# Patient Record
Sex: MALE | Race: ASIAN | NOT HISPANIC OR LATINO | URBAN - METROPOLITAN AREA
[De-identification: names, ages, dates, MRNs, and addresses within clinical notes are randomized per-mention and may not be internally consistent; named-entity substitution may affect disease eponyms.]

---

## 2020-12-02 ENCOUNTER — EMERGENCY (EMERGENCY)
Facility: HOSPITAL | Age: 35
LOS: 1 days | Discharge: HOME | End: 2020-12-02
Attending: STUDENT IN AN ORGANIZED HEALTH CARE EDUCATION/TRAINING PROGRAM
Payer: MEDICAID

## 2020-12-02 ENCOUNTER — EMERGENCY (EMERGENCY)
Facility: HOSPITAL | Age: 35
LOS: 1 days | Discharge: HOME | End: 2020-12-02
Attending: EMERGENCY MEDICINE
Payer: MEDICAID

## 2020-12-02 VITALS
HEART RATE: 88 BPM | HEIGHT: 70 IN | DIASTOLIC BLOOD PRESSURE: 80 MMHG | OXYGEN SATURATION: 99 % | SYSTOLIC BLOOD PRESSURE: 114 MMHG | TEMPERATURE: 98 F | WEIGHT: 136.69 LBS | RESPIRATION RATE: 16 BRPM

## 2020-12-02 VITALS
OXYGEN SATURATION: 100 % | WEIGHT: 138.01 LBS | HEART RATE: 87 BPM | RESPIRATION RATE: 20 BRPM | SYSTOLIC BLOOD PRESSURE: 111 MMHG | TEMPERATURE: 98 F | DIASTOLIC BLOOD PRESSURE: 72 MMHG | HEIGHT: 70 IN

## 2020-12-02 LAB
ALBUMIN SERPL ELPH-MCNC: 3.6 G/DL — SIGNIFICANT CHANGE UP (ref 3.5–5)
ALP SERPL-CCNC: 107 U/L — SIGNIFICANT CHANGE UP (ref 40–120)
ALT FLD-CCNC: 58 U/L DA — SIGNIFICANT CHANGE UP (ref 10–60)
ANION GAP SERPL CALC-SCNC: 6 MMOL/L — SIGNIFICANT CHANGE UP (ref 5–17)
AST SERPL-CCNC: 80 U/L — HIGH (ref 10–40)
BASOPHILS # BLD AUTO: 0.08 K/UL — SIGNIFICANT CHANGE UP (ref 0–0.2)
BASOPHILS NFR BLD AUTO: 2.7 % — HIGH (ref 0–2)
BILIRUB SERPL-MCNC: 0.3 MG/DL — SIGNIFICANT CHANGE UP (ref 0.2–1.2)
BUN SERPL-MCNC: 6 MG/DL — LOW (ref 7–18)
CALCIUM SERPL-MCNC: 8.2 MG/DL — LOW (ref 8.4–10.5)
CHLORIDE SERPL-SCNC: 108 MMOL/L — SIGNIFICANT CHANGE UP (ref 96–108)
CO2 SERPL-SCNC: 28 MMOL/L — SIGNIFICANT CHANGE UP (ref 22–31)
CREAT SERPL-MCNC: 0.61 MG/DL — SIGNIFICANT CHANGE UP (ref 0.5–1.3)
EOSINOPHIL # BLD AUTO: 0.17 K/UL — SIGNIFICANT CHANGE UP (ref 0–0.5)
EOSINOPHIL NFR BLD AUTO: 5.8 % — SIGNIFICANT CHANGE UP (ref 0–6)
GLUCOSE SERPL-MCNC: 88 MG/DL — SIGNIFICANT CHANGE UP (ref 70–99)
HCT VFR BLD CALC: 37.4 % — LOW (ref 39–50)
HGB BLD-MCNC: 12.3 G/DL — LOW (ref 13–17)
IMM GRANULOCYTES NFR BLD AUTO: 0.3 % — SIGNIFICANT CHANGE UP (ref 0–1.5)
LIDOCAIN IGE QN: 837 U/L — HIGH (ref 73–393)
LYMPHOCYTES # BLD AUTO: 1 K/UL — SIGNIFICANT CHANGE UP (ref 1–3.3)
LYMPHOCYTES # BLD AUTO: 34.2 % — SIGNIFICANT CHANGE UP (ref 13–44)
MCHC RBC-ENTMCNC: 31.9 PG — SIGNIFICANT CHANGE UP (ref 27–34)
MCHC RBC-ENTMCNC: 32.9 GM/DL — SIGNIFICANT CHANGE UP (ref 32–36)
MCV RBC AUTO: 96.9 FL — SIGNIFICANT CHANGE UP (ref 80–100)
MONOCYTES # BLD AUTO: 0.49 K/UL — SIGNIFICANT CHANGE UP (ref 0–0.9)
MONOCYTES NFR BLD AUTO: 16.8 % — HIGH (ref 2–14)
NEUTROPHILS # BLD AUTO: 1.17 K/UL — LOW (ref 1.8–7.4)
NEUTROPHILS NFR BLD AUTO: 40.2 % — LOW (ref 43–77)
NRBC # BLD: 0 /100 WBCS — SIGNIFICANT CHANGE UP (ref 0–0)
PLATELET # BLD AUTO: 157 K/UL — SIGNIFICANT CHANGE UP (ref 150–400)
POTASSIUM SERPL-MCNC: 4.1 MMOL/L — SIGNIFICANT CHANGE UP (ref 3.5–5.3)
POTASSIUM SERPL-SCNC: 4.1 MMOL/L — SIGNIFICANT CHANGE UP (ref 3.5–5.3)
PROT SERPL-MCNC: 7.5 G/DL — SIGNIFICANT CHANGE UP (ref 6–8.3)
RBC # BLD: 3.86 M/UL — LOW (ref 4.2–5.8)
RBC # FLD: 11.9 % — SIGNIFICANT CHANGE UP (ref 10.3–14.5)
SODIUM SERPL-SCNC: 142 MMOL/L — SIGNIFICANT CHANGE UP (ref 135–145)
TSH SERPL-MCNC: 0.85 UU/ML — SIGNIFICANT CHANGE UP (ref 0.34–4.82)
WBC # BLD: 2.92 K/UL — LOW (ref 3.8–10.5)
WBC # FLD AUTO: 2.92 K/UL — LOW (ref 3.8–10.5)

## 2020-12-02 PROCEDURE — 85025 COMPLETE CBC W/AUTO DIFF WBC: CPT

## 2020-12-02 PROCEDURE — 73620 X-RAY EXAM OF FOOT: CPT

## 2020-12-02 PROCEDURE — 99283 EMERGENCY DEPT VISIT LOW MDM: CPT | Mod: 25

## 2020-12-02 PROCEDURE — 73130 X-RAY EXAM OF HAND: CPT | Mod: 26,LT

## 2020-12-02 PROCEDURE — 36415 COLL VENOUS BLD VENIPUNCTURE: CPT

## 2020-12-02 PROCEDURE — 73620 X-RAY EXAM OF FOOT: CPT | Mod: 26,50

## 2020-12-02 PROCEDURE — 80053 COMPREHEN METABOLIC PANEL: CPT

## 2020-12-02 PROCEDURE — 99284 EMERGENCY DEPT VISIT MOD MDM: CPT | Mod: 25

## 2020-12-02 PROCEDURE — 99284 EMERGENCY DEPT VISIT MOD MDM: CPT

## 2020-12-02 PROCEDURE — 93005 ELECTROCARDIOGRAM TRACING: CPT

## 2020-12-02 PROCEDURE — 29125 APPL SHORT ARM SPLINT STATIC: CPT | Mod: LT

## 2020-12-02 PROCEDURE — 83690 ASSAY OF LIPASE: CPT

## 2020-12-02 PROCEDURE — 73130 X-RAY EXAM OF HAND: CPT

## 2020-12-02 PROCEDURE — 84443 ASSAY THYROID STIM HORMONE: CPT

## 2020-12-02 RX ORDER — IBUPROFEN 200 MG
600 TABLET ORAL ONCE
Refills: 0 | Status: COMPLETED | OUTPATIENT
Start: 2020-12-02 | End: 2020-12-02

## 2020-12-02 RX ADMIN — Medication 600 MILLIGRAM(S): at 14:43

## 2020-12-02 NOTE — ED ADULT TRIAGE NOTE - PAIN RATING/NUMBER SCALE (0-10): ACTIVITY
Mariana Baird RN
9

## 2020-12-02 NOTE — ED PROVIDER NOTE - CARE PROVIDER_API CALL
Giorgio Hernandez  ORTHOPAEDIC SURGERY  [address], [address] [place] [address]  Phone: [phone]  Fax: [phone]  Follow Up Time:

## 2020-12-02 NOTE — ED PROVIDER NOTE - PATIENT PORTAL LINK FT
You can access the FollowMyHealth Patient Portal offered by [facility] by registering at the following website: [email or web address]. By joining [employer]’s FollowMyHealth portal, you will also be able to view your health information using other applications (apps) compatible with our system.

## 2020-12-02 NOTE — ED PROVIDER NOTE - PROGRESS NOTE DETAILS
fx noted to proximal 5th phalanx.  Ulnar gutter splint applied. Patient now agreeable to pain meds, will provide IBU.  Will have patient f/u with ortho.  Return precautions provided.

## 2020-12-02 NOTE — ED PROVIDER NOTE - OBJECTIVE STATEMENT
34 y/o M pt with no significant PMHx and no significant PSHx presents to ED c/o 4 months of worsening numbness and pain to toes of both feet. Pt is employed by the  and recently got out of rehab 2 weeks ago. Pt anticipates gautam back in 2 weeks. Pt denies trauma, back pain, weakness, headache, dizziness, syncope, or any other acute complaints. NKDA. 36 y/o M pt with no significant PMHx and no significant PSHx presents to ED c/o 4 months of worsening numbness and pain to toes of both feet. Pt is employed by the  and recently got out of alcohol rehab 2 weeks ago. Had drastically decreased his drinking from 20 to 8 beers daily. Pt anticipates going back to rehab for assessment in 2 weeks, speaks with them daily. Pt denies trauma, back pain, weakness, headache, dizziness, cp, sob, HA, syncope, or any other acute complaints. NKDA.

## 2020-12-02 NOTE — ED ADULT NURSE NOTE - NS ED NOTE ABUSE SUSPICION NEGLECT YN
Noted.  
Patient is more worn out and is out and  the end of his road with cancer treatment per Paula at the cancer center.  He will receive one more month of drugs.    They are discussing Hospice options with the family and other support options.    Oncology will adjust medications.    
Please call Paula from the Cancer Center at [facility] to discuss discontinuing a blood pressure medication because of his blood pressure being low.  His cancer is progressing, and considering hospice.  Please call her at [phone].  
No

## 2020-12-02 NOTE — ED PROVIDER NOTE - PROGRESS NOTE DETAILS
Spoke with Ana who will provide out patient rehab resources as well. Pt is again adamant he has not passed out and is refusing any evaluation for syncope.

## 2020-12-02 NOTE — CHART NOTE - NSCHARTNOTEFT_GEN_A_CORE
Verbal referral for etoh abuse.    Pt is a 35 year old male  with no significant PMHx and no significant PSHx . Pt came to the ED c/o 4 months of worsening numbness and pain to toes of both feet. Roberto met with Pt at bedside re referral. He was alert and orientedx3 . He admitted to drinking alcohol and  reported that he has just completed a 2 week alcohol rehab . He reports that he has reduced his alcohol consumption from a litre to 8 shots a day. Pt further reports that he is affiliated to an outpt alcohol program and he receives a call from the program daily.(Pt did not provide name of the program and the phone number provided has been disconnected (  [phone]) Pt was counseled / educated re alcohol and was also commended for practicing harm reduction. Pt was provided with aosas resources. Pt verbalized understanding to counseling. No further swk intervention needed at this time. ED Provider made aware. Pt was d/c'd from the ED.

## 2020-12-02 NOTE — ED PROVIDER NOTE - CLINICAL SUMMARY MEDICAL DECISION MAKING FREE TEXT BOX
34 y/o M pt with chronic toe pain and numbness. Sx suggest neuropathy likely due to chronic EtOH use. Will perform EKG, labs, and consult social work.

## 2020-12-02 NOTE — ED ADULT TRIAGE NOTE - CHIEF COMPLAINT QUOTE
C/o b/l foot pain., ED visit earlier today for  finger pain left hand. Patients brother reported  that patient has been having syncopal episodes after drinking alcohol per Ronny NP
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
## 2020-12-02 NOTE — ED PROVIDER NOTE - ATTENDING CONTRIBUTION TO CARE
I performed the initial face to face bedside interview with this patient regarding history of present illness, review of symptoms and past medical, social and family history.  I completed an independent physical examination.  I was the initial provider who evaluated this patient.  The history, review of symptoms and examination was documented by the scribe in my presence and I attest to the accuracy of the documentation.  I have signed out the follow up of any pending tests (i.e. labs, radiological studies) to the PA/NP.  I have discussed the patient’s plan of care and disposition with the PA/NP.   well appearing male, no acute distress. hand swelling and tenderness. concern for fracture. xray and pain control.

## 2020-12-02 NOTE — ED PROVIDER NOTE - PATIENT PORTAL LINK FT
You can access the FollowMyHealth Patient Portal offered by [facility] by registering at the following website: [email or web address]. By joining [employer]’s FollowMyHealth portal, you will also be able to view your health information using other applications (apps) compatible with our system.
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
## 2020-12-02 NOTE — ED POST DISCHARGE NOTE - ADDITIONAL DOCUMENTATION
brother called, stated that patient has been having multiple episodes of syncope after drinking.  States brother does not usually state this to ED team and would like patient evaluated.  Writer told brother to bring patient back to ED for eval.

## 2020-12-02 NOTE — ED PROVIDER NOTE - PHYSICAL EXAMINATION
MSK: swelling to L hand extending from mid palm down, worse in L 5th finger, tenderness to palpation over L 4th and 5th MCP joints, unable to fully clench fist secondary to pain, sensation intact, cap refill <2secs

## 2020-12-02 NOTE — ED PROVIDER NOTE - MUSCULOSKELETAL, MLM
Spine appears normal, range of motion is not limited. Nonspecific sensitivity and tenderness to toes on b/l feet. DP & PT pulses intact 2+ and palpable

## 2020-12-09 PROBLEM — Z00.00 ENCOUNTER FOR PREVENTIVE HEALTH EXAMINATION: Status: ACTIVE | Noted: 2020-12-09

## 2020-12-11 ENCOUNTER — APPOINTMENT (OUTPATIENT)
Dept: ORTHOPEDIC SURGERY | Facility: CLINIC | Age: 35
End: 2020-12-11
Payer: MEDICAID

## 2020-12-11 VITALS
OXYGEN SATURATION: 99 % | SYSTOLIC BLOOD PRESSURE: 121 MMHG | HEART RATE: 90 BPM | HEIGHT: 70 IN | DIASTOLIC BLOOD PRESSURE: 79 MMHG | BODY MASS INDEX: 19.47 KG/M2 | WEIGHT: 136 LBS

## 2020-12-11 DIAGNOSIS — Z78.9 OTHER SPECIFIED HEALTH STATUS: ICD-10-CM

## 2020-12-11 DIAGNOSIS — F17.200 NICOTINE DEPENDENCE, UNSPECIFIED, UNCOMPLICATED: ICD-10-CM

## 2020-12-11 PROCEDURE — 73130 X-RAY EXAM OF HAND: CPT | Mod: LT

## 2020-12-11 PROCEDURE — 99204 OFFICE O/P NEW MOD 45 MIN: CPT

## 2020-12-11 RX ORDER — DICLOFENAC SODIUM 50 MG/1
50 TABLET, DELAYED RELEASE ORAL TWICE DAILY
Qty: 60 | Refills: 0 | Status: ACTIVE | COMMUNITY
Start: 2020-12-11

## 2020-12-11 NOTE — HISTORY OF PRESENT ILLNESS
[de-identified] : CC L Hand\par \par HPI 34 yo male left HD presents with acute onset of one week of constant pain in the small finger left hand after injury. The pain is same, and rated a 5 out of 10, described as sharp, [without radiation]. Rest makes the pain better and motion makes the pain worse. The patient reports associated symptoms of swelling. The patient - pain at night affecting sleep, - neck pain, and + similar pain previously small finger and ring finger right hand.\par \par The patient has tried the following treatments:\par Activity modification	+\par Ice			+\par Brace			+ patient's was removed the brace multiple times\par Nsaids    		-\par Physical Therapy  	-\par Cortisone Injection	-\par Arthroscopy/Surgery	-\par \par Review of Systems is positive for the above musculoskeletal symptoms and is otherwise non-contributory for general, constitutional, psychiatric, neurologic, HEENT, cardiac, respiratory, gastrointestinal, reproductive, lymphatic, and dermatologic complaints.\par \par Consult by

## 2020-12-11 NOTE — DISCUSSION/SUMMARY
[de-identified] : Left fifth small finger proximal phalanx base intra-articular fracture with mild dorsal angulation\par \par \par I discussed with Seth history exam and radiology\par \par Discussed operative and nonoperative modalities in the patient's dominant hand injury and career\par \par \par Patient does not wish for surgical management\par \par Ulnar gutter splint applied intrinsic plus\par \par Elevate ice\par \par The patient was prescribed Diclofenac PO non-steroidal anti-inflammatory medication. 50mg tablets twice daily to be taken for at least 1-2 weeks in a row and then PRN afterwards. Risks and benefits were discussed and include but not limited to renal damage and GI ulceration and bleeding.  They were advised to take with food to limit stomach upset as well as warned to stop the medication if worsening gastric pain or dizziness or other side effects. Also to immediately stop the medication and seek appropriate medical attention if any severe stomach ache, gastritis, black/red vomit, black/red stools or any other medical concern.\par \par \par \par Follow up one week

## 2021-01-12 ENCOUNTER — APPOINTMENT (OUTPATIENT)
Dept: ORTHOPEDIC SURGERY | Facility: CLINIC | Age: 36
End: 2021-01-12
Payer: MEDICAID

## 2021-01-12 VITALS
HEIGHT: 70 IN | WEIGHT: 136 LBS | HEART RATE: 103 BPM | BODY MASS INDEX: 19.47 KG/M2 | SYSTOLIC BLOOD PRESSURE: 117 MMHG | DIASTOLIC BLOOD PRESSURE: 76 MMHG

## 2021-01-12 DIAGNOSIS — S62.617A DISPLACED FRACTURE OF PROXIMAL PHALANX OF LEFT LITTLE FINGER, INITIAL ENCOUNTER FOR CLOSED FRACTURE: ICD-10-CM

## 2021-01-12 PROCEDURE — 99214 OFFICE O/P EST MOD 30 MIN: CPT

## 2021-01-12 PROCEDURE — 73140 X-RAY EXAM OF FINGER(S): CPT | Mod: F4

## 2021-01-12 PROCEDURE — 99072 ADDL SUPL MATRL&STAF TM PHE: CPT

## 2021-01-12 NOTE — PHYSICAL EXAM
[de-identified] : Physical Examination\par General: well nourished, in no acute distress, alert and oriented to person, place and time\par Psychiatric: normal mood and affect, no abnormal movements or speech patterns\par Eyes: vision intact + glasses\par Throat: no thyromegaly\par Lymph: no enlarged nodes, no lymphedema in extremity\par Respiratory: no wheezing, no shortness of breath with ambulation\par Cardiac: no cardiac leg swelling, 2+ peripheral pulses\par Neurology: normal gross sensation in extremities to light touch\par Abdomen: soft, non-tender, tympanic, no masses\par \par Musculoskeletal Examination\par Cervical spine		Full painless range of motion and negative Spurling's test\par Limited exam secondary to fracture\par Hand			Right			Left\par Appearance\par      Skin			normal			normal\par      Swelling/Deformity	normal			swelling about the small finger\par  Range of Motion\par      Wrist Flexion		75			75\par      Wrist Extension	60			60\par      Finger Flexion  	0 cm palmar crease	0 cm palmar crease decreased motion SF MCP\par      Finger Extension	Full			Full decreased motion SF MCP\par      Supination		85			85\par      Pronation		90			90\par \par Palpation\par      Snuff box		-			-\par      ScaphoLunate 	-			-\par      ECU/Ulna Styloid	-			-\par      Cubital Tunnel 	-			-\par      OTHER		-			- SF base PF\par Strength Examination\par      Wrist Flexion		5+ / 0			5+ / 0\par      Wrist Extension	5+ / 0			5+ / 0\par      Finger Flexion  	5+ / 0			5+ / 0\par      Finger Extension	5+ / 0			5+ / 0\par      			-			-\par      Pincer		-			-\par Sensation\par      Axillary		normal			normal\par      LatAntCubBrach 	normal			normal\par      Median 		normal			normal\par      Ulnar 		normal			normal\par      Radial 		normal			normal\par Motor\par      AIN 			normal			normal\par      Ulnar 		normal			normal\par      Radial 		normal			normal\par      PIN 			normal			normal\par Pulses\par      Radial	 	2+			2+\par  [de-identified] : 3 views of the left hand\par 12-11-20\par Demonstrate:\par \par Minimal displaced fracture of the small finger proximal phalanx with intra-articular extension with apex volar with roughly 15° of dorsal angulation slightly increased from prior ER view\par \par 3 views of the left hand\par Were Ordered, Taken and Evaluated by Myself Today and\par Demonstrate:\par \par Minimal displaced fracture of the small finger proximal phalanx with intra-articular extension with apex volar with roughly 15° of dorsal angulation unchanged from prior visit, interval callous formation and mild consolidation

## 2021-01-12 NOTE — HISTORY OF PRESENT ILLNESS
[de-identified] : CC L Hand\par \par HPI 34 yo male left HD presents for 6 week splint fu of acute onset of one week of constant pain in the small finger left hand after injury. The pain is same, and rated a 5 out of 10, described as sharp, [without radiation]. Rest makes the pain better and motion makes the pain worse. The patient reports associated symptoms of swelling. The patient - pain at night affecting sleep, - neck pain, and + similar pain previously small finger and ring finger right hand.\par \par The patient has tried the following treatments:\par Activity modification	+\par Ice			+\par Brace			+ patient's was removed the brace multiple times, removed splint applined last visit and didn't replace splint\par Nsaids    		-\par Physical Therapy  	-\par Cortisone Injection	-\par Arthroscopy/Surgery	-\par \par no pain, + stiffness\par \par Review of Systems is positive for the above musculoskeletal symptoms and is otherwise non-contributory for general, constitutional, psychiatric, neurologic, HEENT, cardiac, respiratory, gastrointestinal, reproductive, lymphatic, and dermatologic complaints.\par \par Consult by

## 2021-01-12 NOTE — DISCUSSION/SUMMARY
[de-identified] : Left fifth small finger proximal phalanx base intra-articular fracture with mild dorsal angulation\par \par \par I discussed with Seth history exam and radiology\par \par Discussed operative and nonoperative modalities in the patient's dominant hand injury and career\par \par \par Patient does not wish for surgical management\par \par PT\par \par Follow up 6 week

## 2021-01-14 ENCOUNTER — TRANSCRIPTION ENCOUNTER (OUTPATIENT)
Age: 36
End: 2021-01-14

## 2021-02-23 ENCOUNTER — APPOINTMENT (OUTPATIENT)
Dept: ORTHOPEDIC SURGERY | Facility: CLINIC | Age: 36
End: 2021-02-23

## 2021-05-27 ENCOUNTER — EMERGENCY (EMERGENCY)
Facility: HOSPITAL | Age: 36
LOS: 1 days | Discharge: HOME | End: 2021-05-27
Attending: EMERGENCY MEDICINE
Payer: MEDICAID

## 2021-05-27 VITALS
HEIGHT: 70 IN | RESPIRATION RATE: 6 BRPM | WEIGHT: 160.06 LBS | DIASTOLIC BLOOD PRESSURE: 90 MMHG | HEART RATE: 115 BPM | TEMPERATURE: 99 F | SYSTOLIC BLOOD PRESSURE: 132 MMHG | OXYGEN SATURATION: 96 %

## 2021-05-27 LAB
ALBUMIN SERPL ELPH-MCNC: 4.2 G/DL — SIGNIFICANT CHANGE UP (ref 3.5–5)
ALP SERPL-CCNC: 130 U/L — HIGH (ref 40–120)
ALT FLD-CCNC: 89 U/L DA — HIGH (ref 10–60)
ANION GAP SERPL CALC-SCNC: 12 MMOL/L — SIGNIFICANT CHANGE UP (ref 5–17)
AST SERPL-CCNC: 238 U/L — HIGH (ref 10–40)
BILIRUB SERPL-MCNC: 0.9 MG/DL — SIGNIFICANT CHANGE UP (ref 0.2–1.2)
BUN SERPL-MCNC: 6 MG/DL — LOW (ref 7–18)
CALCIUM SERPL-MCNC: 8.8 MG/DL — SIGNIFICANT CHANGE UP (ref 8.4–10.5)
CHLORIDE SERPL-SCNC: 100 MMOL/L — SIGNIFICANT CHANGE UP (ref 96–108)
CO2 SERPL-SCNC: 26 MMOL/L — SIGNIFICANT CHANGE UP (ref 22–31)
CREAT SERPL-MCNC: 0.6 MG/DL — SIGNIFICANT CHANGE UP (ref 0.5–1.3)
GLUCOSE SERPL-MCNC: 95 MG/DL — SIGNIFICANT CHANGE UP (ref 70–99)
HCT VFR BLD CALC: 36.8 % — LOW (ref 39–50)
HGB BLD-MCNC: 12.5 G/DL — LOW (ref 13–17)
MCHC RBC-ENTMCNC: 32.4 PG — SIGNIFICANT CHANGE UP (ref 27–34)
MCHC RBC-ENTMCNC: 34 GM/DL — SIGNIFICANT CHANGE UP (ref 32–36)
MCV RBC AUTO: 95.3 FL — SIGNIFICANT CHANGE UP (ref 80–100)
NRBC # BLD: 0 /100 WBCS — SIGNIFICANT CHANGE UP (ref 0–0)
PLATELET # BLD AUTO: 46 K/UL — LOW (ref 150–400)
POTASSIUM SERPL-MCNC: 3.8 MMOL/L — SIGNIFICANT CHANGE UP (ref 3.5–5.3)
POTASSIUM SERPL-SCNC: 3.8 MMOL/L — SIGNIFICANT CHANGE UP (ref 3.5–5.3)
PROT SERPL-MCNC: 7.8 G/DL — SIGNIFICANT CHANGE UP (ref 6–8.3)
RBC # BLD: 3.86 M/UL — LOW (ref 4.2–5.8)
RBC # FLD: 12.4 % — SIGNIFICANT CHANGE UP (ref 10.3–14.5)
SODIUM SERPL-SCNC: 138 MMOL/L — SIGNIFICANT CHANGE UP (ref 135–145)
WBC # BLD: 3.15 K/UL — LOW (ref 3.8–10.5)
WBC # FLD AUTO: 3.15 K/UL — LOW (ref 3.8–10.5)

## 2021-05-27 PROCEDURE — 99284 EMERGENCY DEPT VISIT MOD MDM: CPT

## 2021-05-27 PROCEDURE — 99283 EMERGENCY DEPT VISIT LOW MDM: CPT

## 2021-05-27 PROCEDURE — 36415 COLL VENOUS BLD VENIPUNCTURE: CPT

## 2021-05-27 PROCEDURE — 80053 COMPREHEN METABOLIC PANEL: CPT

## 2021-05-27 PROCEDURE — 85027 COMPLETE CBC AUTOMATED: CPT

## 2021-05-27 RX ORDER — CICLOPIROX OLAMINE 7.7 MG/G
1 CREAM TOPICAL
Qty: 168 | Refills: 0
Start: 2021-05-27 | End: 2021-11-10

## 2021-05-27 RX ORDER — FLUCONAZOLE 150 MG/1
1 TABLET ORAL
Qty: 7 | Refills: 0
Start: 2021-05-27 | End: 2021-06-02

## 2021-05-27 NOTE — ED PROVIDER NOTE - PATIENT PORTAL LINK FT
You can access the FollowMyHealth Patient Portal offered by [facility] by registering at the following website: [email or web address]. By joining [employer]’s FollowMyHealth portal, you will also be able to view your health information using other applications (apps) compatible with our system.

## 2021-05-27 NOTE — ED ADULT TRIAGE NOTE - CHIEF COMPLAINT QUOTE
BLE swelling/ pain x 4 months, no SOB, pt also stated "I think I have fungus as well in my toe nails"

## 2021-05-27 NOTE — ED ADULT NURSE NOTE - OBJECTIVE STATEMENT
Pt c/o B/L LE swelling x 4 months. No acute distress noted, denies chest pain, no shortness of breath indicated.

## 2021-05-27 NOTE — ED PROVIDER NOTE - OBJECTIVE STATEMENT
37 y/o M pt with no significant PMHx presents to ED c/o persistent pain of both feet w/broken skin between both feet as well as discoloration of nails x2-3 months. Pt denies any other acute complaints. NKDA.

## 2021-05-27 NOTE — ED ADULT TRIAGE NOTE - ARRIVAL FROM
FOR FEVER, ACHES AND PAIN:    Acetaminophen (Tylenol): Maximum 1 gram (1000mg) per dose, up to 3 times per day.  Maximum 3000mg in 24 hours    Ibuprofen (Motrin, Advil): Maximum 800mg per dose, up to 3 times per day.  Maximum 2400mg in 24 hours.  Take with food.  If you take this daily for more than 1 week, start taking over the counter Omeprazole (Prilosec) or Esomeprazole (Nexium) daily to protect your stomach.    Naproxen (Aleve): Maximum 500mg per dose, up to 2 times per day.  Maximum 2 doses in 24 hours.  Take with food.  If you take this daily for more than 1 week, start taking over the counter Omeprazole (Prilosec) or Esomeprazole (Nexium) daily to protect your stomach.    FOR ALLERGIES AND NASAL SINUS CONGESTION:  (sneezing, itching, runny nose, stuff nose)    Take an oral antihistamine (Claritin, Zyrtec, Allegra, Benadryl) daily (in the evening if it makes you sleepy).    Use a nasal steroid spray (Flonase, Nasonex, Nasacort, Rhinocort) twice daily until congestion gets better, then decrease to as needed.    Take over the counter decongestants like phenylephrine and pseudoephedrine.  **Do not take these if you have high blood pressure**    FOR CHEST CONGESTION AND MUCOUS:    Take over the counter Guaifenesin - this will loosen and break up thick mucous.    Vicks Vapor Rub    FOR COUGH:    Over the counter Dextromethorphan    Cough drops    Warm liquids with honey    FOR THROAT PAIN:    Over the counter Chloraseptic spray, Cepacol lozenges    Warm liquids   Home

## 2021-06-20 ENCOUNTER — INPATIENT (INPATIENT)
Facility: HOSPITAL | Age: 36
LOS: 7 days | Discharge: HOME | DRG: 896 | End: 2021-06-28
Attending: HOSPITALIST | Admitting: HOSPITALIST
Payer: MEDICAID

## 2021-06-20 VITALS
SYSTOLIC BLOOD PRESSURE: 124 MMHG | TEMPERATURE: 99 F | HEIGHT: 70 IN | RESPIRATION RATE: 16 BRPM | OXYGEN SATURATION: 97 % | DIASTOLIC BLOOD PRESSURE: 91 MMHG | HEART RATE: 124 BPM

## 2021-06-20 LAB
ALBUMIN SERPL ELPH-MCNC: 3.9 G/DL — SIGNIFICANT CHANGE UP (ref 3.5–5)
ALBUMIN SERPL ELPH-MCNC: 4.1 G/DL — SIGNIFICANT CHANGE UP (ref 3.5–5)
ALP SERPL-CCNC: 100 U/L — SIGNIFICANT CHANGE UP (ref 40–120)
ALT FLD-CCNC: 76 U/L DA — HIGH (ref 10–60)
ANION GAP SERPL CALC-SCNC: 12 MMOL/L — SIGNIFICANT CHANGE UP (ref 5–17)
ANION GAP SERPL CALC-SCNC: 12 MMOL/L — SIGNIFICANT CHANGE UP (ref 5–17)
AST SERPL-CCNC: 159 U/L — HIGH (ref 10–40)
BILIRUB SERPL-MCNC: 1.5 MG/DL — HIGH (ref 0.2–1.2)
BUN SERPL-MCNC: 5 MG/DL — LOW (ref 7–18)
BUN SERPL-MCNC: 5 MG/DL — LOW (ref 7–18)
CALCIUM SERPL-MCNC: 9.3 MG/DL — SIGNIFICANT CHANGE UP (ref 8.4–10.5)
CALCIUM SERPL-MCNC: 9.4 MG/DL — SIGNIFICANT CHANGE UP (ref 8.4–10.5)
CHLORIDE SERPL-SCNC: 100 MMOL/L — SIGNIFICANT CHANGE UP (ref 96–108)
CHLORIDE SERPL-SCNC: 100 MMOL/L — SIGNIFICANT CHANGE UP (ref 96–108)
CO2 SERPL-SCNC: 24 MMOL/L — SIGNIFICANT CHANGE UP (ref 22–31)
CO2 SERPL-SCNC: 25 MMOL/L — SIGNIFICANT CHANGE UP (ref 22–31)
CREAT SERPL-MCNC: 0.69 MG/DL — SIGNIFICANT CHANGE UP (ref 0.5–1.3)
CREAT SERPL-MCNC: 0.73 MG/DL — SIGNIFICANT CHANGE UP (ref 0.5–1.3)
GLUCOSE SERPL-MCNC: 137 MG/DL — HIGH (ref 70–99)
GLUCOSE SERPL-MCNC: 138 MG/DL — HIGH (ref 70–99)
HCT VFR BLD CALC: 37 % — LOW (ref 39–50)
HGB BLD-MCNC: 12.5 G/DL — LOW (ref 13–17)
MAGNESIUM SERPL-MCNC: 1.4 MG/DL — LOW (ref 1.6–2.6)
MCHC RBC-ENTMCNC: 33 PG — SIGNIFICANT CHANGE UP (ref 27–34)
MCHC RBC-ENTMCNC: 33.8 GM/DL — SIGNIFICANT CHANGE UP (ref 32–36)
MCV RBC AUTO: 97.6 FL — SIGNIFICANT CHANGE UP (ref 80–100)
PHOSPHATE SERPL-MCNC: 1.9 MG/DL — LOW (ref 2.5–4.5)
POTASSIUM SERPL-MCNC: 3.7 MMOL/L — SIGNIFICANT CHANGE UP (ref 3.5–5.3)
POTASSIUM SERPL-MCNC: 3.8 MMOL/L — SIGNIFICANT CHANGE UP (ref 3.5–5.3)
POTASSIUM SERPL-SCNC: 3.7 MMOL/L — SIGNIFICANT CHANGE UP (ref 3.5–5.3)
POTASSIUM SERPL-SCNC: 3.8 MMOL/L — SIGNIFICANT CHANGE UP (ref 3.5–5.3)
PROT SERPL-MCNC: 7.9 G/DL — SIGNIFICANT CHANGE UP (ref 6–8.3)
RBC # BLD: 3.79 M/UL — LOW (ref 4.2–5.8)
RBC # FLD: 11.7 % — SIGNIFICANT CHANGE UP (ref 10.3–14.5)
SODIUM SERPL-SCNC: 136 MMOL/L — SIGNIFICANT CHANGE UP (ref 135–145)
SODIUM SERPL-SCNC: 137 MMOL/L — SIGNIFICANT CHANGE UP (ref 135–145)
TROPONIN I SERPL-MCNC: <0.015 NG/ML — SIGNIFICANT CHANGE UP (ref 0–0.04)

## 2021-06-20 PROCEDURE — 99285 EMERGENCY DEPT VISIT HI MDM: CPT

## 2021-06-20 RX ORDER — FOLIC ACID 0.8 MG
1 TABLET ORAL ONCE
Refills: 0 | Status: COMPLETED | OUTPATIENT
Start: 2021-06-20 | End: 2021-06-20

## 2021-06-20 RX ORDER — SODIUM CHLORIDE 9 MG/ML
1000 INJECTION INTRAMUSCULAR; INTRAVENOUS; SUBCUTANEOUS ONCE
Refills: 0 | Status: COMPLETED | OUTPATIENT
Start: 2021-06-20 | End: 2021-06-20

## 2021-06-20 RX ORDER — ONDANSETRON 8 MG/1
4 TABLET, FILM COATED ORAL ONCE
Refills: 0 | Status: COMPLETED | OUTPATIENT
Start: 2021-06-20 | End: 2021-06-20

## 2021-06-20 RX ORDER — THIAMINE MONONITRATE (VIT B1) 100 MG
500 TABLET ORAL ONCE
Refills: 0 | Status: COMPLETED | OUTPATIENT
Start: 2021-06-20 | End: 2021-06-20

## 2021-06-20 RX ORDER — DIAZEPAM 5 MG
10 TABLET ORAL ONCE
Refills: 0 | Status: DISCONTINUED | OUTPATIENT
Start: 2021-06-20 | End: 2021-06-20

## 2021-06-20 RX ADMIN — Medication 105 MILLIGRAM(S): at 23:59

## 2021-06-20 RX ADMIN — Medication 1 MILLIGRAM(S): at 23:58

## 2021-06-20 RX ADMIN — Medication 10 MILLIGRAM(S): at 23:59

## 2021-06-20 RX ADMIN — ONDANSETRON 4 MILLIGRAM(S): 8 TABLET, FILM COATED ORAL at 23:19

## 2021-06-20 RX ADMIN — Medication 1 MILLIGRAM(S): at 23:59

## 2021-06-20 RX ADMIN — SODIUM CHLORIDE 1000 MILLILITER(S): 9 INJECTION INTRAMUSCULAR; INTRAVENOUS; SUBCUTANEOUS at 23:19

## 2021-06-20 NOTE — ED ADULT TRIAGE NOTE - CHIEF COMPLAINT QUOTE
as per mother pt has been drinking alcohol frequently for 16 years , last drink was yesterday, and today pt has been vomiting many times and just had a seizure 30 min prior to arrival, mother found pt shaking , eyes rolling unresponsive with foam coming out of his mouth, pt is paranoid as per ems , fs was 156 as per ems  pt lives with his mother

## 2021-06-20 NOTE — ED ADULT NURSE NOTE - PRO INTERPRETER NEED 2
Dr. Wes Alfaro is supervising provider today.    Levon presents to the clinic [facility] for 2 unit of PRBCs    1 - No physically strenuous activity, but ambulatory and able to carry out light or sedentary work.    Nursing Assessment: A comprehensive nursing assessment was performed and the patient reports the following:     Please refer to JUSTO Estrada note for ROS.      Pre-Treatment:    Visit Vitals  /67   Pulse 85   Temp 98.2 °F (36.8 °C) (Oral)   Resp 18   SpO2 100%     Patient type and crossed on 9/24/18  Consent obtained by provider.  Premedications: Yes     Labs:  ?  WBC (K/mcL)   Date Value   09/24/2018 7.1     RBC (mil/mcL)   Date Value   09/24/2018 2.19 (L)     HCT (%)   Date Value   09/24/2018 16.3 (L)     HGB (g/dL)   Date Value   09/24/2018 4.5 (LL)     PLT (K/mcL)   Date Value   09/24/2018 159   11/25/2013 263        Treatment:    Refer to [facility] and MAR for line assessment and medication administration  Refer to Blood Administration flowsheet for documentation  Patient is identified by first & last name, date of birth, MRN, with the patient at the chair side.      Post Treatment: Treatment tolerated well; no adverse reaction  ?  Education:    Barriers to Learning: None  Teaching Method: Blood Transfusion FYWB  Reviewed possible transfusion reaction including: type of reaction, symptoms and management.  Learner Outcomes:  Patient and/or caregiver verbalize understanding of information given    Next appointment scheduled:   Future Appointments   Date Time Provider Department Center   9/25/2018  9:15 AM SDT [email or web address] SDT   9/28/2018  8:30 AM SDT [email or web address] SDT   9/28/2018  8:45 AM SDT [facility] TREATMENT CHAIR [email or web address] SDT   9/28/2018  1:30 PM SDT CT 1 [facility] SDT   10/2/2018  9:00 AM SDT [email or web address] SDT   10/2/2018  9:15 AM Dominique Ingram MD [email or web address] SDT   10/22/2018  9:30 AM Jd Hansen MD [facility]       Patient instructed to call the office with any questions or  English concerns.    Central Line Type: Port  Central Line Site: Right  and Chest  Port cleansed with ChloraPrep per protocol.  Accessed via: 20 gauge Nuñez needle  Patency Verification: Blood return greater or equal to 3 ml before, during and after treatment  Port flushed with: 10 ml 0.9 normal saline and 100 un/ml- 500 units heparin  Nuñez needle removed: Yes  Deaccess/site appearance: Clear (no redness, tenderness, or edema), dressing applied if required  Discharged: Stable         Patient Discharged: Pt discharged to home per self, ambulatory.

## 2021-06-20 NOTE — ED PROVIDER NOTE - OBJECTIVE STATEMENT
35 y/o male h/o alcohol abuse, presents after seizure. Seizure witnessed by parents and sister, described it as him falling back onto the couch and then started shaking, foaming at the mouth, and unresponsive for 1 min. No head trauma. They called EMS. Pt denies any hx of seizures in the past though he has gotten shaky from alcohol withdrawal. He reports he was sober for 1 week 3 months ago after detox but then starting drinking 750ml whiskey a day and now drinking 8 small bottles of alcohol a day. Last drink unknown. He reports last drink was 2 weeks ago however family reports they noticed he stopped drinking 2 days ago. He does report for last 2 weeks he has been unable to eat or drink anything due to vomiting.

## 2021-06-20 NOTE — ED ADULT NURSE NOTE - OBJECTIVE STATEMENT
per mother pt has been drinking alcohol frequently for 16 years , last drink was yesterday, c/o vomiting s/p seizure 30 min prior to arrival, mother found pt shaking , eyes rolling unresponsive with foam coming out of his mouth.bld.drawn and sent to lab

## 2021-06-20 NOTE — ED PROVIDER NOTE - CLINICAL SUMMARY MEDICAL DECISION MAKING FREE TEXT BOX
Exam shows pt in alcohol withdrawal. Will obtain EKG and labs. Given Thymine, Folate, Ativan, and Valium. Placed on CIWA protocol. Will likely admit for further management. Exam shows pt in alcohol withdrawal. Will obtain EKG and labs. Given Thymine, Folate, Ativan, and Valium. Placed on CIWA protocol. Will likely admit for further management.    labs show AST>ALT, lipase 1500, lactate 3.7-given IVF, CXR unremarkable, UA shows small blood and moderate bacteria  on reeval patient now is febrile, given ibuprofen, bcx, rpt lactate sent  patient stable for admission

## 2021-06-20 NOTE — ED PROVIDER NOTE - CARE PLAN
Principal Discharge DX:	Alcohol withdrawal seizure without complication  Secondary Diagnosis:	Alcohol-induced acute pancreatitis, unspecified complication status

## 2021-06-21 DIAGNOSIS — R74.01 ELEVATION OF LEVELS OF LIVER TRANSAMINASE LEVELS: ICD-10-CM

## 2021-06-21 DIAGNOSIS — K85.20 ALCOHOL INDUCED ACUTE PANCREATITIS WITHOUT NECROSIS OR INFECTION: ICD-10-CM

## 2021-06-21 DIAGNOSIS — F10.230 ALCOHOL DEPENDENCE WITH WITHDRAWAL, UNCOMPLICATED: ICD-10-CM

## 2021-06-21 DIAGNOSIS — E83.39 OTHER DISORDERS OF PHOSPHORUS METABOLISM: ICD-10-CM

## 2021-06-21 DIAGNOSIS — Z29.9 ENCOUNTER FOR PROPHYLACTIC MEASURES, UNSPECIFIED: ICD-10-CM

## 2021-06-21 DIAGNOSIS — E83.42 HYPOMAGNESEMIA: ICD-10-CM

## 2021-06-21 LAB
ALP SERPL-CCNC: 103 U/L — SIGNIFICANT CHANGE UP (ref 40–120)
ALT FLD-CCNC: 77 U/L DA — HIGH (ref 10–60)
ANION GAP SERPL CALC-SCNC: 11 MMOL/L — SIGNIFICANT CHANGE UP (ref 5–17)
ANION GAP SERPL CALC-SCNC: 9 MMOL/L — SIGNIFICANT CHANGE UP (ref 5–17)
APPEARANCE UR: CLEAR — SIGNIFICANT CHANGE UP
AST SERPL-CCNC: 163 U/L — HIGH (ref 10–40)
BASOPHILS # BLD AUTO: 0 K/UL — SIGNIFICANT CHANGE UP (ref 0–0.2)
BASOPHILS NFR BLD AUTO: 0 % — SIGNIFICANT CHANGE UP (ref 0–2)
BILIRUB DIRECT SERPL-MCNC: 0.7 MG/DL — HIGH (ref 0–0.2)
BILIRUB INDIRECT FLD-MCNC: 0.8 MG/DL — SIGNIFICANT CHANGE UP (ref 0.2–1)
BILIRUB SERPL-MCNC: 1.5 MG/DL — HIGH (ref 0.2–1.2)
BILIRUB UR-MCNC: NEGATIVE — SIGNIFICANT CHANGE UP
BUN SERPL-MCNC: 3 MG/DL — LOW (ref 7–18)
BUN SERPL-MCNC: 4 MG/DL — LOW (ref 7–18)
CALCIUM SERPL-MCNC: 7.8 MG/DL — LOW (ref 8.4–10.5)
CALCIUM SERPL-MCNC: 8.4 MG/DL — SIGNIFICANT CHANGE UP (ref 8.4–10.5)
CHLORIDE SERPL-SCNC: 100 MMOL/L — SIGNIFICANT CHANGE UP (ref 96–108)
CHLORIDE SERPL-SCNC: 105 MMOL/L — SIGNIFICANT CHANGE UP (ref 96–108)
CO2 SERPL-SCNC: 22 MMOL/L — SIGNIFICANT CHANGE UP (ref 22–31)
CO2 SERPL-SCNC: 28 MMOL/L — SIGNIFICANT CHANGE UP (ref 22–31)
COLOR SPEC: YELLOW — SIGNIFICANT CHANGE UP
CREAT SERPL-MCNC: 0.4 MG/DL — LOW (ref 0.5–1.3)
CREAT SERPL-MCNC: 0.59 MG/DL — SIGNIFICANT CHANGE UP (ref 0.5–1.3)
DIFF PNL FLD: ABNORMAL
EOSINOPHIL # BLD AUTO: 0.26 K/UL — SIGNIFICANT CHANGE UP (ref 0–0.5)
EOSINOPHIL NFR BLD AUTO: 4 % — SIGNIFICANT CHANGE UP (ref 0–6)
ETHANOL SERPL-MCNC: <3 MG/DL — SIGNIFICANT CHANGE UP (ref 0–10)
GLUCOSE BLDC GLUCOMTR-MCNC: 103 MG/DL — HIGH (ref 70–99)
GLUCOSE BLDC GLUCOMTR-MCNC: 77 MG/DL — SIGNIFICANT CHANGE UP (ref 70–99)
GLUCOSE BLDC GLUCOMTR-MCNC: 80 MG/DL — SIGNIFICANT CHANGE UP (ref 70–99)
GLUCOSE BLDC GLUCOMTR-MCNC: 93 MG/DL — SIGNIFICANT CHANGE UP (ref 70–99)
GLUCOSE SERPL-MCNC: 62 MG/DL — LOW (ref 70–99)
GLUCOSE SERPL-MCNC: 80 MG/DL — SIGNIFICANT CHANGE UP (ref 70–99)
GLUCOSE UR QL: NEGATIVE — SIGNIFICANT CHANGE UP
KETONES UR-MCNC: ABNORMAL
LACTATE SERPL-SCNC: 0.9 MMOL/L — SIGNIFICANT CHANGE UP (ref 0.7–2)
LACTATE SERPL-SCNC: 2.4 MMOL/L — HIGH (ref 0.7–2)
LACTATE SERPL-SCNC: 3.7 MMOL/L — HIGH (ref 0.7–2)
LEUKOCYTE ESTERASE UR-ACNC: ABNORMAL
LIDOCAIN IGE QN: 1501 U/L — HIGH (ref 73–393)
LYMPHOCYTES # BLD AUTO: 0.33 K/UL — LOW (ref 1–3.3)
LYMPHOCYTES # BLD AUTO: 5 % — LOW (ref 13–44)
MAGNESIUM SERPL-MCNC: 1.2 MG/DL — LOW (ref 1.6–2.6)
MAGNESIUM SERPL-MCNC: 2.1 MG/DL — SIGNIFICANT CHANGE UP (ref 1.6–2.6)
MONOCYTES # BLD AUTO: 0.79 K/UL — SIGNIFICANT CHANGE UP (ref 0–0.9)
MONOCYTES NFR BLD AUTO: 12 % — SIGNIFICANT CHANGE UP (ref 2–14)
NEUTROPHILS # BLD AUTO: 5.2 K/UL — SIGNIFICANT CHANGE UP (ref 1.8–7.4)
NEUTROPHILS NFR BLD AUTO: 79 % — HIGH (ref 43–77)
NITRITE UR-MCNC: NEGATIVE — SIGNIFICANT CHANGE UP
PH UR: 7 — SIGNIFICANT CHANGE UP (ref 5–8)
PHOSPHATE SERPL-MCNC: 2.2 MG/DL — LOW (ref 2.5–4.5)
PHOSPHATE SERPL-MCNC: 3.7 MG/DL — SIGNIFICANT CHANGE UP (ref 2.5–4.5)
PLATELET # BLD AUTO: 89 K/UL — LOW (ref 150–400)
POTASSIUM SERPL-MCNC: 3.2 MMOL/L — LOW (ref 3.5–5.3)
POTASSIUM SERPL-MCNC: 3.7 MMOL/L — SIGNIFICANT CHANGE UP (ref 3.5–5.3)
POTASSIUM SERPL-SCNC: 3.2 MMOL/L — LOW (ref 3.5–5.3)
POTASSIUM SERPL-SCNC: 3.7 MMOL/L — SIGNIFICANT CHANGE UP (ref 3.5–5.3)
PROT SERPL-MCNC: 7.7 G/DL — SIGNIFICANT CHANGE UP (ref 6–8.3)
PROT UR-MCNC: 100
SARS-COV-2 RNA SPEC QL NAA+PROBE: SIGNIFICANT CHANGE UP
SODIUM SERPL-SCNC: 137 MMOL/L — SIGNIFICANT CHANGE UP (ref 135–145)
SODIUM SERPL-SCNC: 138 MMOL/L — SIGNIFICANT CHANGE UP (ref 135–145)
SP GR SPEC: 1.01 — SIGNIFICANT CHANGE UP (ref 1.01–1.02)
TRIGL SERPL-MCNC: 78 MG/DL — SIGNIFICANT CHANGE UP
UROBILINOGEN FLD QL: 8
WBC # BLD: 6.58 K/UL — SIGNIFICANT CHANGE UP (ref 3.8–10.5)
WBC # FLD AUTO: 6.58 K/UL — SIGNIFICANT CHANGE UP (ref 3.8–10.5)

## 2021-06-21 PROCEDURE — 99223 1ST HOSP IP/OBS HIGH 75: CPT

## 2021-06-21 PROCEDURE — 71045 X-RAY EXAM CHEST 1 VIEW: CPT | Mod: 26

## 2021-06-21 RX ORDER — DEXTROSE 50 % IN WATER 50 %
25 SYRINGE (ML) INTRAVENOUS ONCE
Refills: 0 | Status: DISCONTINUED | OUTPATIENT
Start: 2021-06-21 | End: 2021-06-28

## 2021-06-21 RX ORDER — SODIUM CHLORIDE 9 MG/ML
1000 INJECTION INTRAMUSCULAR; INTRAVENOUS; SUBCUTANEOUS ONCE
Refills: 0 | Status: COMPLETED | OUTPATIENT
Start: 2021-06-21 | End: 2021-06-21

## 2021-06-21 RX ORDER — FOLIC ACID 0.8 MG
1 TABLET ORAL DAILY
Refills: 0 | Status: DISCONTINUED | OUTPATIENT
Start: 2021-06-21 | End: 2021-06-28

## 2021-06-21 RX ORDER — SODIUM CHLORIDE 9 MG/ML
1000 INJECTION, SOLUTION INTRAVENOUS
Refills: 0 | Status: DISCONTINUED | OUTPATIENT
Start: 2021-06-21 | End: 2021-06-21

## 2021-06-21 RX ORDER — SODIUM CHLORIDE 9 MG/ML
1000 INJECTION, SOLUTION INTRAVENOUS
Refills: 0 | Status: DISCONTINUED | OUTPATIENT
Start: 2021-06-21 | End: 2021-06-22

## 2021-06-21 RX ORDER — POTASSIUM PHOSPHATE, MONOBASIC POTASSIUM PHOSPHATE, DIBASIC 236; 224 MG/ML; MG/ML
15 INJECTION, SOLUTION INTRAVENOUS ONCE
Refills: 0 | Status: COMPLETED | OUTPATIENT
Start: 2021-06-21 | End: 2021-06-21

## 2021-06-21 RX ORDER — FAMOTIDINE 10 MG/ML
20 INJECTION INTRAVENOUS
Refills: 0 | Status: DISCONTINUED | OUTPATIENT
Start: 2021-06-21 | End: 2021-06-23

## 2021-06-21 RX ORDER — CEFTRIAXONE 500 MG/1
1000 INJECTION, POWDER, FOR SOLUTION INTRAMUSCULAR; INTRAVENOUS EVERY 24 HOURS
Refills: 0 | Status: DISCONTINUED | OUTPATIENT
Start: 2021-06-21 | End: 2021-06-21

## 2021-06-21 RX ORDER — IBUPROFEN 200 MG
600 TABLET ORAL EVERY 8 HOURS
Refills: 0 | Status: DISCONTINUED | OUTPATIENT
Start: 2021-06-21 | End: 2021-06-23

## 2021-06-21 RX ORDER — CEFTRIAXONE 500 MG/1
1000 INJECTION, POWDER, FOR SOLUTION INTRAMUSCULAR; INTRAVENOUS EVERY 24 HOURS
Refills: 0 | Status: DISCONTINUED | OUTPATIENT
Start: 2021-06-21 | End: 2021-06-23

## 2021-06-21 RX ORDER — THIAMINE MONONITRATE (VIT B1) 100 MG
500 TABLET ORAL EVERY 8 HOURS
Refills: 0 | Status: COMPLETED | OUTPATIENT
Start: 2021-06-21 | End: 2021-06-24

## 2021-06-21 RX ORDER — IBUPROFEN 200 MG
400 TABLET ORAL EVERY 6 HOURS
Refills: 0 | Status: DISCONTINUED | OUTPATIENT
Start: 2021-06-21 | End: 2021-06-23

## 2021-06-21 RX ORDER — DEXTROSE 50 % IN WATER 50 %
50 SYRINGE (ML) INTRAVENOUS ONCE
Refills: 0 | Status: COMPLETED | OUTPATIENT
Start: 2021-06-21 | End: 2021-06-21

## 2021-06-21 RX ORDER — ENOXAPARIN SODIUM 100 MG/ML
40 INJECTION SUBCUTANEOUS DAILY
Refills: 0 | Status: DISCONTINUED | OUTPATIENT
Start: 2021-06-21 | End: 2021-06-28

## 2021-06-21 RX ORDER — POTASSIUM CHLORIDE 20 MEQ
40 PACKET (EA) ORAL EVERY 4 HOURS
Refills: 0 | Status: COMPLETED | OUTPATIENT
Start: 2021-06-21 | End: 2021-06-21

## 2021-06-21 RX ORDER — MAGNESIUM SULFATE 500 MG/ML
4 VIAL (ML) INJECTION ONCE
Refills: 0 | Status: COMPLETED | OUTPATIENT
Start: 2021-06-21 | End: 2021-06-21

## 2021-06-21 RX ORDER — INSULIN LISPRO 100/ML
VIAL (ML) SUBCUTANEOUS EVERY 6 HOURS
Refills: 0 | Status: DISCONTINUED | OUTPATIENT
Start: 2021-06-21 | End: 2021-06-24

## 2021-06-21 RX ORDER — ONDANSETRON 8 MG/1
4 TABLET, FILM COATED ORAL EVERY 6 HOURS
Refills: 0 | Status: DISCONTINUED | OUTPATIENT
Start: 2021-06-21 | End: 2021-06-28

## 2021-06-21 RX ORDER — IBUPROFEN 200 MG
600 TABLET ORAL ONCE
Refills: 0 | Status: COMPLETED | OUTPATIENT
Start: 2021-06-21 | End: 2021-06-21

## 2021-06-21 RX ADMIN — SODIUM CHLORIDE 1000 MILLILITER(S): 9 INJECTION INTRAMUSCULAR; INTRAVENOUS; SUBCUTANEOUS at 01:47

## 2021-06-21 RX ADMIN — Medication 600 MILLIGRAM(S): at 03:12

## 2021-06-21 RX ADMIN — SODIUM CHLORIDE 1000 MILLILITER(S): 9 INJECTION INTRAMUSCULAR; INTRAVENOUS; SUBCUTANEOUS at 01:51

## 2021-06-21 RX ADMIN — Medication 105 MILLIGRAM(S): at 21:38

## 2021-06-21 RX ADMIN — Medication 2 MILLIGRAM(S): at 21:39

## 2021-06-21 RX ADMIN — Medication 2 MILLIGRAM(S): at 13:58

## 2021-06-21 RX ADMIN — Medication 1 MILLIGRAM(S): at 13:59

## 2021-06-21 RX ADMIN — SODIUM CHLORIDE 150 MILLILITER(S): 9 INJECTION, SOLUTION INTRAVENOUS at 13:59

## 2021-06-21 RX ADMIN — POTASSIUM PHOSPHATE, MONOBASIC POTASSIUM PHOSPHATE, DIBASIC 83.33 MILLIMOLE(S): 236; 224 INJECTION, SOLUTION INTRAVENOUS at 11:21

## 2021-06-21 RX ADMIN — FAMOTIDINE 20 MILLIGRAM(S): 10 INJECTION INTRAVENOUS at 17:18

## 2021-06-21 RX ADMIN — Medication 50 MILLILITER(S): at 16:46

## 2021-06-21 RX ADMIN — Medication 2 MILLIGRAM(S): at 17:18

## 2021-06-21 RX ADMIN — Medication 500 MILLIGRAM(S): at 05:43

## 2021-06-21 RX ADMIN — Medication 2 MILLIGRAM(S): at 23:56

## 2021-06-21 RX ADMIN — Medication 600 MILLIGRAM(S): at 01:50

## 2021-06-21 RX ADMIN — Medication 100 GRAM(S): at 06:05

## 2021-06-21 RX ADMIN — CEFTRIAXONE 100 MILLIGRAM(S): 500 INJECTION, POWDER, FOR SOLUTION INTRAMUSCULAR; INTRAVENOUS at 21:38

## 2021-06-21 RX ADMIN — SODIUM CHLORIDE 150 MILLILITER(S): 9 INJECTION, SOLUTION INTRAVENOUS at 06:05

## 2021-06-21 RX ADMIN — ENOXAPARIN SODIUM 40 MILLIGRAM(S): 100 INJECTION SUBCUTANEOUS at 13:59

## 2021-06-21 RX ADMIN — SODIUM CHLORIDE 1000 MILLILITER(S): 9 INJECTION INTRAMUSCULAR; INTRAVENOUS; SUBCUTANEOUS at 00:47

## 2021-06-21 RX ADMIN — Medication 1 TABLET(S): at 13:59

## 2021-06-21 RX ADMIN — SODIUM CHLORIDE 150 MILLILITER(S): 9 INJECTION, SOLUTION INTRAVENOUS at 21:38

## 2021-06-21 RX ADMIN — SODIUM CHLORIDE 150 MILLILITER(S): 9 INJECTION, SOLUTION INTRAVENOUS at 16:47

## 2021-06-21 RX ADMIN — Medication 40 MILLIEQUIVALENT(S): at 21:38

## 2021-06-21 RX ADMIN — Medication 40 MILLIEQUIVALENT(S): at 16:46

## 2021-06-21 RX ADMIN — Medication 2 MILLIGRAM(S): at 06:04

## 2021-06-21 RX ADMIN — Medication 2 MILLIGRAM(S): at 01:50

## 2021-06-21 NOTE — H&P ADULT - PROBLEM SELECTOR PLAN 1
Admitted for alcohol withdrawal seizures  - Will Manage with Ativan PRN 2q2h and Ativan 2q6h standing dose  - Bal < 3 on admission, last drink 2 days PTA   - started on ciwa protocol  - Will Manage with IV Thiamine, Folate, Multivitamins  - Will get B12, Folate levels and BMP daily  - Will reduce ativan dosages as possible.  - EEG ordered   - Neuro Dr. Gaxiola consulted

## 2021-06-21 NOTE — H&P ADULT - ATTENDING COMMENTS
Pt seen at bedside  Case discussed with MAR    36 year old man with PMH of chronic alcohol abuse  brought in after a witnessed generalized seizure episode. Associated tonic-clonic properties, with post-ictal phase. He did not suffer injury as he fell on a sofa.   His last drink per family was 2 days ago.      Vital Signs Last 24 Hrs  T(C): 38.8 (21 Jun 2021 00:07), Max: 38.8 (21 Jun 2021 00:07)  T(F): 101.8 (21 Jun 2021 00:07), Max: 101.8 (21 Jun 2021 00:07)  HR: 94 (21 Jun 2021 00:07) (94 - 124)  BP: 137/86 (21 Jun 2021 00:07) (124/91 - 137/86)  RR: 18 (21 Jun 2021 00:07) (16 - 18)  SpO2: 100% (21 Jun 2021 00:07) (97% - 100%)      labs                         12.5   6.58  )-----------( 89       ( 20 Jun 2021 23:32 )             37.0     trig - 78  [id number]  lactate - 3.7    06-20    136  |  100  |  5<L>  ----------------------------<  137<H>  3.7   |  24  |  0.69    Ca    9.4      20 Jun 2021 23:32  Phos  1.9     06-20  Mg     1.4     06-20    TPro  7.9  /  Alb  3.9  /  TBili  1.5<H>  /  DBili  0.7<H>  /  AST  159<H>  /  ALT  76<H>  /  AlkPhos  100  06-20    Mg- 1.4  Phos 1.9    Impression   - Alcohol withdrawal seizure  - Alcoholic pancreatitis  - Hypomagnesemia  - Hypophosphatemia  - Dehydration with lactic acidosis  - Chronic alcoholic liver disease with liver transaminases  - Hyperbilirubinemia with direct hyperbilirubinemia     Plan   - Admit to Medicine   - [facility] protocol   - Aggressive IVF hydration for dehydration and pancreatitis  - IV correction of low magnesium and phos  - Keep NPO  - RUQ U/S  - Serial chemistry, lactate, transaminases, TB and DB  - EEG   - Pt seen at bedside  Case discussed with MAR    36 year old man with PMH of chronic alcohol abuse  brought in after a witnessed generalized seizure episode. Associated tonic-clonic properties, with post-ictal phase. He did not suffer injury as he fell on a sofa.   His last drink per family was 2 days ago.      Vital Signs Last 24 Hrs  T(C): 38.8 (21 Jun 2021 00:07), Max: 38.8 (21 Jun 2021 00:07)  T(F): 101.8 (21 Jun 2021 00:07), Max: 101.8 (21 Jun 2021 00:07)  HR: 94 (21 Jun 2021 00:07) (94 - 124)  BP: 137/86 (21 Jun 2021 00:07) (124/91 - 137/86)  RR: 18 (21 Jun 2021 00:07) (16 - 18)  SpO2: 100% (21 Jun 2021 00:07) (97% - 100%)    Exam   Sleeping, easily rousable, AAO X 3; fine tremors of outstretched hands  fine tongue fasciculations   CTA B/L   Soft NT ND BS +  No pedal edema  No focal deficits      labs                         12.5   6.58  )-----------( 89       ( 20 Jun 2021 23:32 )             37.0     trig - 78  [id number]  lactate - 3.7    06-20    136  |  100  |  5<L>  ----------------------------<  137<H>  3.7   |  24  |  0.69    Ca    9.4      20 Jun 2021 23:32  Phos  1.9     06-20  Mg     1.4     06-20    TPro  7.9  /  Alb  3.9  /  TBili  1.5<H>  /  DBili  0.7<H>  /  AST  159<H>  /  ALT  76<H>  /  AlkPhos  100  06-20    Mg- 1.4  Phos 1.9    Impression   - Alcohol withdrawal seizure  - Alcoholic subclinical pancreatitis  - Hypomagnesemia  - Hypophosphatemia  - Dehydration with lactic acidosis  - Chronic alcoholic liver disease with liver transaminases  - Hyperbilirubinemia with direct hyperbilirubinemia     Plan   - Admit to Medicine   - [facility] protocol   - Aggressive IVF hydration for dehydration and pancreatitis  - IV correction of low magnesium and phos  - Thiamine 500 mg IV q 8 hourly X 3 -5 days  - Keep NPO  - RUQ U/S  - Serial chemistry, lactate, transaminases, TB and DB  - EEG +/- neurology consult

## 2021-06-21 NOTE — CHART NOTE - NSCHARTNOTEFT_GEN_A_CORE
HPI:  36 year old man with PMH of chronic alcohol abuse brought in after a witnessed generalized seizure episode. Pt's mom is at bedside providing hx as pt is mildly sedated. Per mom Pt was not feeling well 1 day PTA and was complaining of nausea and chills. Pt was having Soup and was later noted to be shaking and foaming at mouth with his hands flexed. Mom endorses that his eyes were flexed and pt was not responsive. Pt was confused when the shaking stopped. Mom denies any prior similar events or hx of seizure disorder. Pt does drink significantly daily. Per mom he is drunk every day but he trying to quit for his fiance and his last drinks was 2 days ago.  (2021 02:28)      Patient is a 36y old  Male who presents with a chief complaint of seizure (2021 02:28)      INTERVAL HPI/OVERNIGHT EVENTS:  T(C): 36.9 (21 @ 11:11), Max: 38.8 (21 @ 00:07)  HR: 72 (21 @ 11:11) (66 - 124)  BP: 127/84 (21 @ 11:11) (109/76 - 137/86)  RR: 18 (21 @ 11:11) (16 - 20)  SpO2: 99% (21 @ 11:11) (97% - 100%)  Wt(kg): --  I&O's Summary      REVIEW OF SYSTEMS: denies fever, chills, SOB, palpitations, chest pain, abdominal pain, nausea, vomitting, diarrhea, constipation, dizziness    MEDICATIONS  (STANDING):  dextrose 50% Injectable 25 Gram(s) IV Push once  enoxaparin Injectable 40 milliGRAM(s) SubCutaneous daily  folic acid 1 milliGRAM(s) Oral daily  insulin lispro (ADMELOG) corrective regimen sliding scale   SubCutaneous every 6 hours  lactated ringers. 1000 milliLiter(s) (150 mL/Hr) IV Continuous <Continuous>  LORazepam   Injectable 2 milliGRAM(s) IV Push every 6 hours  multivitamin 1 Tablet(s) Oral daily    MEDICATIONS  (PRN):  LORazepam   Injectable 2 milliGRAM(s) IV Push every 2 hours PRN CIWA-Ar score 7 or greater      PHYSICAL EXAM:  GENERAL: NAD, well-groomed, well-developed  HEAD:  Atraumatic, Normocephalic  EYES: EOMI, PERRLA, conjunctiva and sclera clear  ENMT: No tonsillar erythema, exudates, or enlargement; Moist mucous membranes, Good dentition, No lesions  NECK: Supple, No JVD, Normal thyroid  NERVOUS SYSTEM:  Alert & Oriented X3, Good concentration; Motor Strength 5/5 B/L upper and lower extremities;  CHEST/LUNG: Clear to percussion bilaterally; No rales, rhonchi, wheezing, or rubs  HEART: Regular rate and rhythm; No murmurs, rubs, or gallops  ABDOMEN: Soft, Nontender, Nondistended; Bowel sounds present  EXTREMITIES:  2+ Peripheral Pulses, No clubbing, cyanosis, or edema  LYMPH: No lymphadenopathy noted  SKIN: No rashes or lesions    LABS:                        12.5   6.58  )-----------( 89       ( 2021 23:32 )             37.0     06-21    138  |  105  |  4<L>  ----------------------------<  80  3.7   |  22  |  0.59    Ca    7.8<L>      2021 02:55  Phos  2.2     06-21  Mg     1.2     06-21    TPro  7.9  /  Alb  3.9  /  TBili  1.5<H>  /  DBili  0.7<H>  /  AST  159<H>  /  ALT  76<H>  /  AlkPhos  100  06-20      Urinalysis Basic - ( 2021 00:16 )    Color: Yellow / Appearance: Clear / S.010 / pH: x  Gluc: x / Ketone: Large  / Bili: Negative / Urobili: 8   Blood: x / Protein: 100 / Nitrite: Negative   Leuk Esterase: Small / RBC: 2-5 /HPF / WBC 3-5 /HPF   Sq Epi: x / Non Sq Epi: Few /HPF / Bacteria: Moderate /HPF      CAPILLARY BLOOD GLUCOSE      POCT Blood Glucose.: 80 mg/dL (2021 12:25)  POCT Blood Glucose.: 158 mg/dL (2021 22:02)        Urinalysis Basic - ( 2021 00:16 )    Color: Yellow / Appearance: Clear / S.010 / pH: x  Gluc: x / Ketone: Large  / Bili: Negative / Urobili: 8   Blood: x / Protein: 100 / Nitrite: Negative   Leuk Esterase: Small / RBC: 2-5 /HPF / WBC 3-5 /HPF   Sq Epi: x / Non Sq Epi: Few /HPF / Bacteria: Moderate /HPF      Assessment and plan:    complete note to follow.. HPI:  36 year old man with PMH of chronic alcohol abuse brought in after a witnessed generalized seizure episode. Pt's mom is at bedside providing hx as pt is mildly sedated. Per mom Pt was not feeling well 1 day PTA and was complaining of nausea and chills. Pt was having Soup and was later noted to be shaking and foaming at mouth with his hands flexed. Mom endorses that his eyes were flexed and pt was not responsive. Pt was confused when the shaking stopped. Mom denies any prior similar events or hx of seizure disorder. Pt does drink significantly daily. Per mom he is drunk every day but he trying to quit for his fiance and his last drinks was 2 days ago.  (2021 02:28)      Patient is a 36y old  Male who presents with a chief complaint of seizure (2021 02:28)      INTERVAL HPI/OVERNIGHT EVENTS: Patient seen and examined  T(C): 36.9 (21 @ 11:11), Max: 38.8 (21 @ 00:07)  HR: 72 (21 @ 11:11) (66 - 124)  BP: 127/84 (21 @ 11:11) (109/76 - 137/86)  RR: 18 (21 @ 11:11) (16 - 20)  SpO2: 99% (21 @ 11:11) (97% - 100%)  Wt(kg): --  I&O's Summary      REVIEW OF SYSTEMS: denies fever, chills, SOB, palpitations, chest pain, abdominal pain, nausea, vomitting, diarrhea, constipation, dizziness    MEDICATIONS  (STANDING):  dextrose 50% Injectable 25 Gram(s) IV Push once  enoxaparin Injectable 40 milliGRAM(s) SubCutaneous daily  folic acid 1 milliGRAM(s) Oral daily  insulin lispro (ADMELOG) corrective regimen sliding scale SubCutaneous every 6 hours  lactated ringers. 1000 milliLiter(s) (150 mL/Hr) IV Continuous <Continuous>  LORazepam   Injectable 2 milliGRAM(s) IV Push every 6 hours  multivitamin 1 Tablet(s) Oral daily    MEDICATIONS  (PRN):  LORazepam   Injectable 2 milliGRAM(s) IV Push every 2 hours PRN CIWA-Ar score 7 or greater      PHYSICAL EXAM:  GENERAL: NAD, sleeping in bed comfortably  HEAD:  Atraumatic, Normocephalic  EYES: EOMI, PERRLA, conjunctiva and sclera clear  ENT: dry mucous membranes  NECK: Supple, No JVD  CHEST/LUNG: Clear to auscultation bilaterally   HEART: Regular rate and rhythm; S1+ S2+   ABDOMEN: Bowel sounds present; Soft, Nontender, Nondistended   EXTREMITIES:  2+ Peripheral Pulses, brisk capillary refill. No clubbing, cyanosis, or edema  NERVOUS SYSTEM: Sleeping but easily arousable, tremors in bilateral hands  MSK: FROM all 4 extremities, full and equal strength, clubbing noted in fingers bilaterally upper and lower EXT    LABS:                        12.5   6.58  )-----------( 89       ( 2021 23:32 )             37.0     -    138  |  105  |  4<L>  ----------------------------<  80  3.7   |  22  |  0.59    Ca    7.8<L>      2021 02:55  Phos  2.2     -  Mg     1.2     -    TPro  7.9  /  Alb  3.9  /  TBili  1.5<H>  /  DBili  0.7<H>  /  AST  159<H>  /  ALT  76<H>  /  AlkPhos  100  20      Urinalysis Basic - ( 2021 00:16 )    Color: Yellow / Appearance: Clear / S.010 / pH: x  Gluc: x / Ketone: Large  / Bili: Negative / Urobili: 8   Blood: x / Protein: 100 / Nitrite: Negative   Leuk Esterase: Small / RBC: 2-5 /HPF / WBC 3-5 /HPF   Sq Epi: x / Non Sq Epi: Few /HPF / Bacteria: Moderate /HPF      CAPILLARY BLOOD GLUCOSE      POCT Blood Glucose.: 80 mg/dL (2021 12:25)  POCT Blood Glucose.: 158 mg/dL (2021 22:02)        Urinalysis Basic - ( 2021 00:16 )    Color: Yellow / Appearance: Clear / S.010 / pH: x  Gluc: x / Ketone: Large  / Bili: Negative / Urobili: 8   Blood: x / Protein: 100 / Nitrite: Negative   Leuk Esterase: Small / RBC: 2-5 /HPF / WBC 3-5 /HPF   Sq Epi: x / Non Sq Epi: Few /HPF / Bacteria: Moderate /HPF      Assessment and plan:    - Alcohol withdrawal seizure  - Alcoholic subclinical pancreatitis  - Hypomagnesemia  - Hypophosphatemia  - Dehydration with lactic acidosis  - Chronic alcoholic liver disease with liver transaminases  - Hyperbilirubinemia with direct hyperbilirubinemia   - ? UTI      - on CIWA protocol   - Aggressive IVF hydration for dehydration and pancreatitis, npo except meds  - IV correction of low magnesium and phos, electrolytes.  - Thiamine 500 mg IV q 8 hourly X 3 -5 days  - Keep NPO  - RUQ U/S  - Serial chemistry, lactate improving, transaminases, TB and DB  - EEG +/- neurology consult Dr. Holman.  - positive UA, increased frequency denies burning micturition. will start empirically on iv Ceftriaxone, urine c/s.

## 2021-06-21 NOTE — H&P ADULT - NSHPLABSRESULTS_GEN_ALL_CORE
< from: Xray Chest 1 View-PORTABLE IMMEDIATE (Xray Chest 1 View-PORTABLE IMMEDIATE .) (06.21.21 @ 02:00) >    Mediastinum: The mediastinum is within normal limits.    IMPRESSION:    No lung consolidations.    < end of copied text >

## 2021-06-21 NOTE — H&P ADULT - HISTORY OF PRESENT ILLNESS
36 year old man with PMH of chronic alcohol abuse  brought in after a witnessed generalized seizure episode 36 year old man with PMH of chronic alcohol abuse brought in after a witnessed generalized seizure episode. Pt's mom is at bedside providing hx as pt is sedated.  36 year old man with PMH of chronic alcohol abuse brought in after a witnessed generalized seizure episode. Pt's mom is at bedside providing hx as pt is mildly sedated. Per mom Pt was not feeling well 1 day PTA and was complaining of nausea and chills. Pt was having Soup and was later noted to be shaking and foaming at mouth with his hands flexed. Mom endorses that his eyes were flexed and pt was not responsive. Pt was confused when the shaking stopped. Mom denies any prior similar events 36 year old man with PMH of chronic alcohol abuse brought in after a witnessed generalized seizure episode. Pt's mom is at bedside providing hx as pt is mildly sedated. Per mom Pt was not feeling well 1 day PTA and was complaining of nausea and chills. Pt was having Soup and was later noted to be shaking and foaming at mouth with his hands flexed. Mom endorses that his eyes were flexed and pt was not responsive. Pt was confused when the shaking stopped. Mom denies any prior similar events or hx of seizure disorder. Pt does drink significantly daily. Per mom he is drunk every day but he trying to quit for his fiance and his last drinks was 2 days ago.

## 2021-06-21 NOTE — PATIENT PROFILE ADULT - VISION (WITH CORRECTIVE LENSES IF THE PATIENT USUALLY WEARS THEM):
nearsighted and color blind/Partially impaired: cannot see medication labels or newsprint, but can see obstacles in path, and the surrounding layout; can count fingers at arm's length

## 2021-06-21 NOTE — PATIENT PROFILE ADULT - OVER THE PAST TWO WEEKS, HAVE YOU FELT LITTLE INTEREST OR PLEASURE IN DOING THINGS?
unable to assess due to present state of lethargy. Mother reports that "he doesn't take care of himself anymore"./yes

## 2021-06-21 NOTE — H&P ADULT - NSHPPHYSICALEXAM_GEN_ALL_CORE
Vital Signs Last 24 Hrs  T(C): 36.9 (21 Jun 2021 15:30), Max: 38.8 (21 Jun 2021 00:07)  T(F): 98.4 (21 Jun 2021 15:30), Max: 101.8 (21 Jun 2021 00:07)  HR: 89 (21 Jun 2021 15:30) (66 - 124)  BP: 111/78 (21 Jun 2021 15:30) (109/76 - 137/86)  BP(mean): --  RR: 18 (21 Jun 2021 15:30) (16 - 20)  SpO2: 100% (21 Jun 2021 15:30) (97% - 100%)    Physical exam:  GENERAL: NAD, sleeping in bed comfortably  HEAD:  Atraumatic, Normocephalic  EYES: EOMI, PERRLA, conjunctiva and sclera clear  ENT: dry mucous membranes  NECK: Supple, No JVD  CHEST/LUNG: Clear to auscultation bilaterally   HEART: Regular rate and rhythm; S1+ S2+   ABDOMEN: Bowel sounds present; Soft, Nontender, Nondistended   EXTREMITIES:  2+ Peripheral Pulses, brisk capillary refill. No clubbing, cyanosis, or edema  NERVOUS SYSTEM: Sleeping but easily arousable, tremors in bilateral hands  MSK: FROM all 4 extremities, full and equal strength, clubbing noted in fingers bilaterally upper and lower EXT  SKIN: hypo pigmented lesions noted on the back

## 2021-06-21 NOTE — H&P ADULT - ASSESSMENT
36 year old man with PMH of chronic alcohol abuse brought in after a witnessed generalized seizure episode. Pt is admitted for alcohol withdrawals seizures and subclinical alcohol induced pancreatitis

## 2021-06-21 NOTE — H&P ADULT - PROBLEM SELECTOR PLAN 2
Lipase 1500  Will manage with IV fluids NS-D5 as pt was getting hypoglycemic  Will Maintain NPO for now and will add diet after resolution of symptoms  Pain management with ibuprofen prn   Pepcid 20 mg IV q 12hrs

## 2021-06-22 DIAGNOSIS — N39.0 URINARY TRACT INFECTION, SITE NOT SPECIFIED: ICD-10-CM

## 2021-06-22 LAB
A1C WITH ESTIMATED AVERAGE GLUCOSE RESULT: 5 % — SIGNIFICANT CHANGE UP (ref 4–5.6)
ALBUMIN SERPL ELPH-MCNC: 3.4 G/DL — LOW (ref 3.5–5)
ALP SERPL-CCNC: 81 U/L — SIGNIFICANT CHANGE UP (ref 40–120)
ALT FLD-CCNC: 68 U/L DA — HIGH (ref 10–60)
ANION GAP SERPL CALC-SCNC: 9 MMOL/L — SIGNIFICANT CHANGE UP (ref 5–17)
AST SERPL-CCNC: 127 U/L — HIGH (ref 10–40)
BILIRUB DIRECT SERPL-MCNC: 0.5 MG/DL — HIGH (ref 0–0.2)
BILIRUB INDIRECT FLD-MCNC: 0.8 MG/DL — SIGNIFICANT CHANGE UP (ref 0.2–1)
BILIRUB SERPL-MCNC: 1.3 MG/DL — HIGH (ref 0.2–1.2)
BUN SERPL-MCNC: 2 MG/DL — LOW (ref 7–18)
CALCIUM SERPL-MCNC: 9.3 MG/DL — SIGNIFICANT CHANGE UP (ref 8.4–10.5)
CHLORIDE SERPL-SCNC: 104 MMOL/L — SIGNIFICANT CHANGE UP (ref 96–108)
CHOLEST SERPL-MCNC: 247 MG/DL — HIGH
CO2 SERPL-SCNC: 26 MMOL/L — SIGNIFICANT CHANGE UP (ref 22–31)
COVID-19 SPIKE DOMAIN AB INTERP: NEGATIVE — SIGNIFICANT CHANGE UP
COVID-19 SPIKE DOMAIN ANTIBODY RESULT: 0.4 U/ML — SIGNIFICANT CHANGE UP
CREAT SERPL-MCNC: 0.55 MG/DL — SIGNIFICANT CHANGE UP (ref 0.5–1.3)
ESTIMATED AVERAGE GLUCOSE: 97 MG/DL — SIGNIFICANT CHANGE UP (ref 68–114)
FERRITIN SERPL-MCNC: 940 NG/ML — HIGH (ref 30–400)
FOLATE SERPL-MCNC: 9.6 NG/ML — SIGNIFICANT CHANGE UP
GGT SERPL-CCNC: 680 U/L — HIGH (ref 9–50)
GLUCOSE BLDC GLUCOMTR-MCNC: 104 MG/DL — HIGH (ref 70–99)
GLUCOSE BLDC GLUCOMTR-MCNC: 115 MG/DL — HIGH (ref 70–99)
GLUCOSE BLDC GLUCOMTR-MCNC: 121 MG/DL — HIGH (ref 70–99)
GLUCOSE BLDC GLUCOMTR-MCNC: 129 MG/DL — HIGH (ref 70–99)
GLUCOSE BLDC GLUCOMTR-MCNC: 176 MG/DL — HIGH (ref 70–99)
GLUCOSE BLDC GLUCOMTR-MCNC: 95 MG/DL — SIGNIFICANT CHANGE UP (ref 70–99)
GLUCOSE SERPL-MCNC: 104 MG/DL — HIGH (ref 70–99)
HCT VFR BLD CALC: 36.8 % — LOW (ref 39–50)
HDLC SERPL-MCNC: 92 MG/DL — SIGNIFICANT CHANGE UP
HGB BLD-MCNC: 12.4 G/DL — LOW (ref 13–17)
IRON SATN MFR SERPL: 17 % — LOW (ref 20–55)
IRON SATN MFR SERPL: 52 UG/DL — LOW (ref 65–170)
LIDOCAIN IGE QN: 896 U/L — HIGH (ref 73–393)
LIPID PNL WITH DIRECT LDL SERPL: 129 MG/DL — HIGH
MAGNESIUM SERPL-MCNC: 1.9 MG/DL — SIGNIFICANT CHANGE UP (ref 1.6–2.6)
MCHC RBC-ENTMCNC: 33.2 PG — SIGNIFICANT CHANGE UP (ref 27–34)
MCHC RBC-ENTMCNC: 33.7 GM/DL — SIGNIFICANT CHANGE UP (ref 32–36)
MCV RBC AUTO: 98.4 FL — SIGNIFICANT CHANGE UP (ref 80–100)
NON HDL CHOLESTEROL: 155 MG/DL — HIGH
NRBC # BLD: 0 /100 WBCS — SIGNIFICANT CHANGE UP (ref 0–0)
PHOSPHATE SERPL-MCNC: 2.7 MG/DL — SIGNIFICANT CHANGE UP (ref 2.5–4.5)
PLATELET # BLD AUTO: 99 K/UL — LOW (ref 150–400)
POTASSIUM SERPL-MCNC: 4.2 MMOL/L — SIGNIFICANT CHANGE UP (ref 3.5–5.3)
POTASSIUM SERPL-SCNC: 4.2 MMOL/L — SIGNIFICANT CHANGE UP (ref 3.5–5.3)
PROT SERPL-MCNC: 7.3 G/DL — SIGNIFICANT CHANGE UP (ref 6–8.3)
RBC # BLD: 3.74 M/UL — LOW (ref 4.2–5.8)
RBC # BLD: 3.74 M/UL — LOW (ref 4.2–5.8)
RBC # FLD: 11.4 % — SIGNIFICANT CHANGE UP (ref 10.3–14.5)
RETICS #: 90.1 K/UL — SIGNIFICANT CHANGE UP (ref 25–125)
RETICS/RBC NFR: 2.4 % — SIGNIFICANT CHANGE UP (ref 0.5–2.5)
SARS-COV-2 IGG+IGM SERPL QL IA: 0.4 U/ML — SIGNIFICANT CHANGE UP
SARS-COV-2 IGG+IGM SERPL QL IA: NEGATIVE — SIGNIFICANT CHANGE UP
SODIUM SERPL-SCNC: 139 MMOL/L — SIGNIFICANT CHANGE UP (ref 135–145)
TIBC SERPL-MCNC: 299 UG/DL — SIGNIFICANT CHANGE UP (ref 250–450)
TRIGL SERPL-MCNC: 128 MG/DL — SIGNIFICANT CHANGE UP
TSH SERPL-MCNC: 1.2 UU/ML — SIGNIFICANT CHANGE UP (ref 0.34–4.82)
UIBC SERPL-MCNC: 247 UG/DL — SIGNIFICANT CHANGE UP (ref 110–370)
VIT B12 SERPL-MCNC: 1165 PG/ML — SIGNIFICANT CHANGE UP (ref 232–1245)
WBC # BLD: 3.69 K/UL — LOW (ref 3.8–10.5)
WBC # FLD AUTO: 3.69 K/UL — LOW (ref 3.8–10.5)

## 2021-06-22 PROCEDURE — 76705 ECHO EXAM OF ABDOMEN: CPT | Mod: 26

## 2021-06-22 PROCEDURE — 99221 1ST HOSP IP/OBS SF/LOW 40: CPT

## 2021-06-22 PROCEDURE — 99233 SBSQ HOSP IP/OBS HIGH 50: CPT | Mod: GC

## 2021-06-22 PROCEDURE — 95819 EEG AWAKE AND ASLEEP: CPT | Mod: 26

## 2021-06-22 RX ORDER — NICOTINE POLACRILEX 2 MG
1 GUM BUCCAL ONCE
Refills: 0 | Status: COMPLETED | OUTPATIENT
Start: 2021-06-22 | End: 2021-06-22

## 2021-06-22 RX ORDER — SODIUM CHLORIDE 9 MG/ML
1000 INJECTION, SOLUTION INTRAVENOUS
Refills: 0 | Status: DISCONTINUED | OUTPATIENT
Start: 2021-06-22 | End: 2021-06-23

## 2021-06-22 RX ADMIN — Medication 2 MILLIGRAM(S): at 02:33

## 2021-06-22 RX ADMIN — Medication 1 TABLET(S): at 13:42

## 2021-06-22 RX ADMIN — Medication 1 MILLIGRAM(S): at 14:52

## 2021-06-22 RX ADMIN — Medication 2 MILLIGRAM(S): at 08:23

## 2021-06-22 RX ADMIN — Medication 2 MILLIGRAM(S): at 17:26

## 2021-06-22 RX ADMIN — Medication 2 MILLIGRAM(S): at 21:06

## 2021-06-22 RX ADMIN — Medication 2 MILLIGRAM(S): at 23:47

## 2021-06-22 RX ADMIN — Medication 105 MILLIGRAM(S): at 21:05

## 2021-06-22 RX ADMIN — Medication 105 MILLIGRAM(S): at 14:52

## 2021-06-22 RX ADMIN — Medication 1 PATCH: at 21:39

## 2021-06-22 RX ADMIN — Medication 2 MILLIGRAM(S): at 11:50

## 2021-06-22 RX ADMIN — CEFTRIAXONE 100 MILLIGRAM(S): 500 INJECTION, POWDER, FOR SOLUTION INTRAMUSCULAR; INTRAVENOUS at 21:05

## 2021-06-22 RX ADMIN — ENOXAPARIN SODIUM 40 MILLIGRAM(S): 100 INJECTION SUBCUTANEOUS at 14:53

## 2021-06-22 RX ADMIN — Medication 2 MILLIGRAM(S): at 06:08

## 2021-06-22 RX ADMIN — FAMOTIDINE 20 MILLIGRAM(S): 10 INJECTION INTRAVENOUS at 06:29

## 2021-06-22 RX ADMIN — Medication 0: at 06:29

## 2021-06-22 RX ADMIN — FAMOTIDINE 20 MILLIGRAM(S): 10 INJECTION INTRAVENOUS at 17:21

## 2021-06-22 RX ADMIN — Medication 1: at 17:21

## 2021-06-22 RX ADMIN — SODIUM CHLORIDE 150 MILLILITER(S): 9 INJECTION, SOLUTION INTRAVENOUS at 17:35

## 2021-06-22 RX ADMIN — Medication 105 MILLIGRAM(S): at 06:08

## 2021-06-22 NOTE — CONSULT NOTE ADULT - SUBJECTIVE AND OBJECTIVE BOX
Pt very lethargic; unable to give useful information.  History per Admission H&P.    <Start of quote from H&P>  "Reason for Admission: seizure  History of Present Illness:   36 year old man with PMH of chronic alcohol abuse brought in after a witnessed generalized seizure episode. Pt's mom is at bedside providing hx as pt is mildly sedated. Per mom Pt was not feeling well 1 day PTA and was complaining of nausea and chills. Pt was having Soup and was later noted to be shaking and foaming at mouth with his hands flexed. Mom endorses that his eyes were flexed and pt was not responsive. Pt was confused when the shaking stopped. Mom denies any prior similar events or hx of seizure disorder. Pt does drink significantly daily. Per mom he is drunk every day but he trying to quit for his fiance and his last drinks was 2 days ago.      Review of Systems:  Review of Systems: unable to obtain as pt is not cooperating. complaints of no pain  Other Review of Systems: All other review of systems negative, except as noted in HPI    Allergies and Intolerances:        Allergies:  	No Known Allergies:     Home Medications:   * Patient Currently Takes Medications as of 27-May-2021 13:45 documented in Structured Notes  · 	Diflucan 100 mg oral tablet: 1 tab(s) orally once a day   · 	Penlac Nail Lacquer 8% topical solution: Apply topically to affected area once a day     Patient History:    Past Medical, Past Surgical, and Family History:  PAST MEDICAL HISTORY:  Alcohol abuse.     PAST SURGICAL HISTORY:  No significant past surgical history.     Social History:  Social History (marital status, living situation, occupation, tobacco use, alcohol and drug use, and sexual history): Alcohol: drinks daily, unknown dosage but high amount   Smoking: yes  Illicit drugs: Denied     Tobacco Screening:  · Core Measure Site	Yes  · Has the patient used tobacco in the past 30 days?	Yes   · Tobacco Cessation Education/Counseling	Offered and patient declined   · Tobacco Cessation Medication	Offered and patient declined     Risk Assessment:    Present on Admission:  Deep Venous Thrombosis	no   Pulmonary Embolus	no      Heart Failure:  Does this patient have a history of or has been diagnosed with heart failure? no."  <End of quote from H&P>    Per Epileptologist's report of EEG:  "EEG Summary / Classification:  Normal EEG in the awake, drowsy states.  -Diffuse fast activity.    EEG Impression / Clinical Correlate:  Normal EEG study.  No epileptiform pattern or seizure seen.  Diffuse excess beta activity may be seen with medication use such as benzodiazepines or barbiturates."    B12/folate  1165/9.6  (high B12 NOT on supple,entation)  GGT  680  Hgb A1c  5.0%  Ferritin 940    He is on CIWA protocol.  Was last given lorazepam (2mg IV) 3 hours before this examination.      EXAMINATION     Pt very lethargic; unable to give useful information.  History per Admission H&P.    <Start of quote from H&P>  "Reason for Admission: seizure  History of Present Illness:   36 year old man with PMH of chronic alcohol abuse brought in after a witnessed generalized seizure episode. Pt's mom is at bedside providing hx as pt is mildly sedated. Per mom Pt was not feeling well 1 day PTA and was complaining of nausea and chills. Pt was having Soup and was later noted to be shaking and foaming at mouth with his hands flexed. Mom endorses that his eyes were flexed and pt was not responsive. Pt was confused when the shaking stopped. Mom denies any prior similar events or hx of seizure disorder. Pt does drink significantly daily. Per mom he is drunk every day but he trying to quit for his fiance and his last drinks was 2 days ago.      Review of Systems:  Review of Systems: unable to obtain as pt is not cooperating. complaints of no pain  Other Review of Systems: All other review of systems negative, except as noted in HPI    Allergies and Intolerances:        Allergies:  	No Known Allergies:     Home Medications:   * Patient Currently Takes Medications as of 27-May-2021 13:45 documented in Structured Notes  · 	Diflucan 100 mg oral tablet: 1 tab(s) orally once a day   · 	Penlac Nail Lacquer 8% topical solution: Apply topically to affected area once a day     Patient History:    Past Medical, Past Surgical, and Family History:  PAST MEDICAL HISTORY:  Alcohol abuse.     PAST SURGICAL HISTORY:  No significant past surgical history.     Social History:  Social History (marital status, living situation, occupation, tobacco use, alcohol and drug use, and sexual history): Alcohol: drinks daily, unknown dosage but high amount   Smoking: yes  Illicit drugs: Denied     Tobacco Screening:  · Core Measure Site	Yes  · Has the patient used tobacco in the past 30 days?	Yes   · Tobacco Cessation Education/Counseling	Offered and patient declined   · Tobacco Cessation Medication	Offered and patient declined     Risk Assessment:    Present on Admission:  Deep Venous Thrombosis	no   Pulmonary Embolus	no      Heart Failure:  Does this patient have a history of or has been diagnosed with heart failure? no."  <End of quote from H&P>    Per Epileptologist's report of EEG:  "EEG Summary / Classification:  Normal EEG in the awake, drowsy states.  -Diffuse fast activity.    EEG Impression / Clinical Correlate:  Normal EEG study.  No epileptiform pattern or seizure seen.  Diffuse excess beta activity may be seen with medication use such as benzodiazepines or barbiturates."    B12/folate  1165/9.6  (high B12 NOT on supple,entation)  AST/ALT  127/68  GGT  680  Hgb A1c  5.0%  Ferritin 940    He is on CIWA protocol.  Was last given lorazepam (2mg IV) 3 hours before this examination.  Is getting thiamiine 500mg daily iv x 3 days, MVI, folate.     No prior seizure (per mother).  Pt responds that he has been smoking "for a long time."    EXAMINATION    Medium build.  Bruises in LEs mostly R shin.  Quite somnolent; only partially arousable for brief periods (<30secs theodora time).  Faint voice; almost incomprehensible due to slurring + sleepiness.  Gives his name; location = "I have to think about it."  Questioned if at Counts include 234 beds at the [facility] he answers "yes'' to hospital.  Gives date (s l o w l y   in piecemeal fashion) as Sun June 20 2021.  When told that was 2 days ago, he eventually (with prodding came up with TuesdayJune 22.      PERRL; CATARACTS; EOMI.  No gross UE drift.  Performs FNF (eyes open/closed) s l o w l y, without dysmetria, or tremor.  Could not test HKS; unsafe to get him OOB given the lethargy.      Reflex                           Right    Left   Comment    Biceps                              0      0  Triceps                            0      0  Patellar                            0      0  Gastroc                            0      0  Plantar                        mute   mute    Digital clubbing; spooning of toes.

## 2021-06-22 NOTE — PROGRESS NOTE ADULT - PROBLEM SELECTOR PLAN 6
IMPROVE VTE Individual Risk Assessment  RISK                                                         Points  [  ] Previous VTE                                      3  [  ] Thrombophilia                                   2  [  ] Lower limb paralysis                         2 (unable to hold up >15 seconds)    [  ] Current Cancer                                  2       (within 6 months)  [  ] Immobilization > 24 hrs                    1  [  ] ICU/CCU stay > 24 hrs                         1  [  ] Age > 60                                              1  lovenox for dvt ppx Pt noted to have elevated LFT's now trending down  - likely in the setting of etoh abuse   - RUQ US negative

## 2021-06-22 NOTE — PROGRESS NOTE ADULT - PROBLEM SELECTOR PLAN 1
Admitted for alcohol withdrawal seizures  - Will Manage with Ativan PRN 2q2h and Ativan 2q6h standing dose  - Bal < 3 on admission, last drink 2 days PTA   - started on ciwa protocol  - Will Manage with IV Thiamine, Folate, Multivitamins  - Will get B12, Folate levels and BMP daily  - Will reduce ativan dosages as possible.  - EEG ordered   - Neuro Dr. Gaxiola consulted Admitted for alcohol withdrawal seizures  - C/w Ativan PRN 2q2h and Ativan 2q6h standing dose  - Bal < 3 on admission, last drink 2 days PTA   - c/w [facility] protocol  - c/w IV Thiamine, Folate, Multivitamins  - B12 & Folate levels wnl  - Will reduce ativan dosages as possible.  - EEG: No epileptiform pattern or seizure seen.  - Neuro Dr. Gaxiola consulted

## 2021-06-22 NOTE — PROGRESS NOTE ADULT - PROBLEM SELECTOR PLAN 2
Lipase 1500  Will manage with IV fluids NS-D5 as pt was getting hypoglycemic  Will Maintain NPO for now and will add diet after resolution of symptoms  Pain management with ibuprofen prn   Pepcid 20 mg IV q 12hrs Lipase 1500  c/w IV fluids NS-D5 as pt was getting hypoglycemic  Diet advanced to clears  Pain management with ibuprofen prn   Pepcid 20 mg IV q 12hrs

## 2021-06-22 NOTE — PROGRESS NOTE ADULT - PROBLEM SELECTOR PLAN 3
Pt noted to have low magnesium   s/p replacement   check magnesium qd Resolved  Pt noted to have low magnesium   s/p replacement   check magnesium qd UA positive  C/W rocephin  Follow urine cultures

## 2021-06-22 NOTE — PROGRESS NOTE ADULT - PROBLEM SELECTOR PLAN 5
Pt noted to have elevated LFT's   - likely in the setting of etoh abuse   - RUQ US ordered Pt noted to have elevated LFT's now trending down  - likely in the setting of etoh abuse   - RUQ US negative Resolved  Pt noted to have sever hypophosphatemia   s/p replacement

## 2021-06-22 NOTE — EEG REPORT - NS EEG TEXT BOX
[facility]  [facility]  Report of Routine EEG with Video    [facility]: [address], [place] [address], Phone [phone]  [facility]: [address] [place] [address], Phone [phone]  [place] Office: [address], [address], [place] [address] Phone [phone]    [facility]: [address] [place] [address], Phone [phone]  [facility]: [address] [place] [address], Phone [phone]    Patient Name: GIRISH PASCUAL    Age: 36 year  : 1985  Patient ID: -, MRN #: -, Location: --  Referring Physician: -  EEG #: [id number]    Study Date: 2021     Technical Information:					  On Instrument: -  Placement and Labeling of Electrodes:  The EEG was performed utilizing 20 channels referential EEG connections (coronal over temporal over parasagittal montage) using all standard 10-20 electrode placements.  Recording was at a sampling rate of 256 samples per second per channel.  Time synchronized digital video recording was done simultaneously with EEG recording.  A low light infrared camera was used for low light recording.  Julio and seizure detection algorithms were utilized.    History:   Routine study.performed bedside  Patient awake and tense  Behavior difficult  37 Y/O male presents with :  Alcohol abuse  R/O seizure    Pertinent Medication  No Data.    Study Interpretation:  Findings: The background was continuous, spontaneously variable and reactive. During wakefulness, the posterior dominant rhythm consisted of symmetric, well-modulated 9 Hz activity, with amplitude to 30 uV, that attenuated to eye opening.     Background Slowing:  No generalized background slowing was present.    Focal Slowing:   None were present.    Sleep Background:  Drowsiness was characterized by fragmentation, attenuation, and slowing of the background activity.    Stage II sleep transients were not recorded.    Other Non-Epileptiform Findings:  Diffuse excess beta activity.    Interictal Epileptiform Activity:   None were present.    Events:  Clinical events: None recorded.  Seizures: None recorded.    Activation Procedures:   Hyperventilation was not performed.    Photic stimulation was performed and did not elicit any abnormalities.      Artifacts:  Intermittent myogenic and movement artifacts were noted.    EEG Summary / Classification:  Normal EEG in the awake, drowsy states.  -Diffuse fast activity.    EEG Impression / Clinical Correlate:  Normal EEG study.  No epileptiform pattern or seizure seen.  Diffuse excess beta activity may be seen with medication use such as benzodiazepines or barbiturates.    Kai Acuña MD  Attending Physician, [facility] [place]

## 2021-06-22 NOTE — PROGRESS NOTE ADULT - PROBLEM SELECTOR PLAN 4
Pt noted to have sever hypophosphatemia   s/p replacement Resolved  Pt noted to have sever hypophosphatemia   s/p replacement Resolved  Pt noted to have low magnesium   s/p replacement   check magnesium qd

## 2021-06-22 NOTE — CONSULT NOTE ADULT - ASSESSMENT
Chronic EtOH abuse.    Acute EtOH withdrawal.    EtOH withdrawal seizure.    Alcoholic pancreatitis.    Alcoholic hepatitis.    Normal EEG noted.      RECOMMENDATIONS    No indication for anticonvulsant medication in this setting,      Ammonia, CK, methylmalonic acid, homocysteine.    Non-con head CT.

## 2021-06-22 NOTE — PROGRESS NOTE ADULT - SUBJECTIVE AND OBJECTIVE BOX
PGY-1 Progress Note discussed with attending    PAGER #: [[phone]] TILL 5:00 PM  PLEASE CONTACT ON CALL TEAM:  - On Call Team (Please refer to Wesley) FROM 5:00 PM - 8:30PM  - Nightfloat Team FROM 8:30 -7:30 AM    CHIEF COMPLAINT & BRIEF HOSPITAL COURSE:    INTERVAL HPI/OVERNIGHT EVENTS:       MEDICATIONS  (STANDING):  cefTRIAXone   IVPB 1000 milliGRAM(s) IV Intermittent every 24 hours  dextrose 5% + sodium chloride 0.9%. 1000 milliLiter(s) (150 mL/Hr) IV Continuous <Continuous>  dextrose 50% Injectable 25 Gram(s) IV Push once  enoxaparin Injectable 40 milliGRAM(s) SubCutaneous daily  famotidine    Tablet 20 milliGRAM(s) Oral two times a day  folic acid 1 milliGRAM(s) Oral daily  insulin lispro (ADMELOG) corrective regimen sliding scale   SubCutaneous every 6 hours  LORazepam   Injectable 2 milliGRAM(s) IV Push every 6 hours  multivitamin 1 Tablet(s) Oral daily  thiamine IVPB 500 milliGRAM(s) IV Intermittent every 8 hours    MEDICATIONS  (PRN):  ibuprofen  Tablet. 400 milliGRAM(s) Oral every 6 hours PRN Mild Pain (1 - 3)  ibuprofen  Tablet. 600 milliGRAM(s) Oral every 8 hours PRN Moderate Pain (4 - 6)  LORazepam   Injectable 2 milliGRAM(s) IV Push every 2 hours PRN CIWA-Ar score 7 or greater  ondansetron Injectable 4 milliGRAM(s) IV Push every 6 hours PRN Nausea and/or Vomiting      REVIEW OF SYSTEMS:  CONSTITUTIONAL: No fever, weight loss, or fatigue  RESPIRATORY: No cough, wheezing, chills or hemoptysis; No shortness of breath  CARDIOVASCULAR: No chest pain, palpitations, dizziness, or leg swelling  GASTROINTESTINAL: No abdominal pain. No nausea, vomiting, or hematemesis; No diarrhea or constipation. No melena or hematochezia.  GENITOURINARY: No dysuria or hematuria, urinary frequency  NEUROLOGICAL: No headaches, memory loss, loss of strength, numbness, or tremors  SKIN: No itching, burning, rashes, or lesions     Vital Signs Last 24 Hrs  T(C): 37.1 (22 Jun 2021 05:51), Max: 37.1 (21 Jun 2021 21:03)  T(F): 98.8 (22 Jun 2021 05:51), Max: 98.8 (22 Jun 2021 05:51)  HR: 81 (22 Jun 2021 05:51) (72 - 89)  BP: 124/86 (22 Jun 2021 05:51) (111/78 - 127/84)  BP(mean): --  RR: 18 (22 Jun 2021 05:51) (18 - 18)  SpO2: 96% (22 Jun 2021 05:51) (96% - 100%)    PHYSICAL EXAMINATION:  GENERAL: NAD, well built  HEAD:  Atraumatic, Normocephalic  EYES:  conjunctiva and sclera clear  NECK: Supple, No JVD, Normal thyroid  CHEST/LUNG: Clear to auscultation. Clear to percussion bilaterally; No rales, rhonchi, wheezing, or rubs  HEART: Regular rate and rhythm; No murmurs, rubs, or gallops  ABDOMEN: Soft, Nontender, Nondistended; Bowel sounds present  NERVOUS SYSTEM:  Alert & Oriented X3,    EXTREMITIES:  2+ Peripheral Pulses, No clubbing, cyanosis, or edema  SKIN: warm dry                          12.4   3.69  )-----------( 99       ( 22 Jun 2021 05:55 )             36.8     06-22    139  |  104  |  2<L>  ----------------------------<  104<H>  4.2   |  26  |  0.55    Ca    9.3      22 Jun 2021 05:55  Phos  2.7     06-22  Mg     1.9     06-22    TPro  7.9  /  Alb  3.9  /  TBili  1.5<H>  /  DBili  0.7<H>  /  AST  159<H>  /  ALT  76<H>  /  AlkPhos  100  06-20    LIVER FUNCTIONS - ( 20 Jun 2021 23:32 )  Alb: 3.9 g/dL / Pro: 7.9 g/dL / ALK PHOS: 100 U/L / ALT: 76 U/L DA / AST: 159 U/L / GGT: x           CARDIAC MARKERS ( 20 Jun 2021 23:32 )  <0.015 ng/mL / x     / x     / x     / x              CAPILLARY BLOOD GLUCOSE      POCT Blood Glucose.: 104 mg/dL (22 Jun 2021 08:12)        Culture - Blood (collected 21 Jun 2021 08:21)  Source: .Blood Blood-Peripheral  Preliminary Report (22 Jun 2021 09:01):    No growth to date.    Culture - Blood (collected 21 Jun 2021 08:21)  Source: .Blood Blood-Peripheral  Preliminary Report (22 Jun 2021 09:01):    No growth to date.        RADIOLOGY & ADDITIONAL TESTS:                   PGY-1 Progress Note discussed with attending    PAGER #: [[phone]] TILL 5:00 PM  PLEASE CONTACT ON CALL TEAM:  - On Call Team (Please refer to Wesley) FROM 5:00 PM - 8:30PM  - Nightfloat Team FROM 8:30 -7:30 AM    CHIEF COMPLAINT & BRIEF HOSPITAL COURSE:  36 year old man with PMH of chronic alcohol abuse brought in after a witnessed generalized seizure episode. Pt's mom is at bedside providing hx as pt is mildly sedated. Per mom Pt was not feeling well 1 day PTA and was complaining of nausea and chills. Pt was having Soup and was later noted to be shaking and foaming at mouth with his hands flexed. Mom endorses that his eyes were flexed and pt was not responsive. Pt was confused when the shaking stopped. Mom denies any prior similar events or hx of seizure disorder. Pt does drink significantly daily. Per mom he is drunk every day but he trying to quit for his fiance and his last drinks was 2 days ago.    INTERVAL HPI/OVERNIGHT EVENTS:   No acute overnight events. No seizures in the hospital. EEG negative for epileptiform activity. Required 6mg of PRN ativan over 24hours. CIWA 4 this am.       MEDICATIONS  (STANDING):  cefTRIAXone   IVPB 1000 milliGRAM(s) IV Intermittent every 24 hours  dextrose 5% + sodium chloride 0.9%. 1000 milliLiter(s) (150 mL/Hr) IV Continuous <Continuous>  dextrose 50% Injectable 25 Gram(s) IV Push once  enoxaparin Injectable 40 milliGRAM(s) SubCutaneous daily  famotidine    Tablet 20 milliGRAM(s) Oral two times a day  folic acid 1 milliGRAM(s) Oral daily  insulin lispro (ADMELOG) corrective regimen sliding scale   SubCutaneous every 6 hours  LORazepam   Injectable 2 milliGRAM(s) IV Push every 6 hours  multivitamin 1 Tablet(s) Oral daily  thiamine IVPB 500 milliGRAM(s) IV Intermittent every 8 hours    MEDICATIONS  (PRN):  ibuprofen  Tablet. 400 milliGRAM(s) Oral every 6 hours PRN Mild Pain (1 - 3)  ibuprofen  Tablet. 600 milliGRAM(s) Oral every 8 hours PRN Moderate Pain (4 - 6)  LORazepam   Injectable 2 milliGRAM(s) IV Push every 2 hours PRN CIWA-Ar score 7 or greater  ondansetron Injectable 4 milliGRAM(s) IV Push every 6 hours PRN Nausea and/or Vomiting      REVIEW OF SYSTEMS:  CONSTITUTIONAL: No fever, weight loss, or fatigue  RESPIRATORY: No cough, wheezing, chills or hemoptysis; No shortness of breath  CARDIOVASCULAR: No chest pain, palpitations, dizziness, or leg swelling  GASTROINTESTINAL: No abdominal pain. No nausea, vomiting, or hematemesis; No diarrhea or constipation. No melena or hematochezia.  GENITOURINARY: No dysuria or hematuria, urinary frequency  NEUROLOGICAL: No headaches, memory loss, loss of strength, numbness, or tremors  SKIN: No itching, burning, rashes, or lesions     Vital Signs Last 24 Hrs  T(C): 37.1 (22 Jun 2021 05:51), Max: 37.1 (21 Jun 2021 21:03)  T(F): 98.8 (22 Jun 2021 05:51), Max: 98.8 (22 Jun 2021 05:51)  HR: 81 (22 Jun 2021 05:51) (72 - 89)  BP: 124/86 (22 Jun 2021 05:51) (111/78 - 127/84)  BP(mean): --  RR: 18 (22 Jun 2021 05:51) (18 - 18)  SpO2: 96% (22 Jun 2021 05:51) (96% - 100%)    PHYSICAL EXAMINATION:  GENERAL: NAD  HEAD:  Atraumatic, Normocephalic  EYES:  conjunctiva and sclera clear  NECK: Supple, No JVD, Normal thyroid  CHEST/LUNG: Clear to auscultation. Clear to percussion bilaterally; No rales, rhonchi, wheezing, or rubs  HEART: Regular rate and rhythm; No murmurs, rubs, or gallops  ABDOMEN: Soft, Nontender, Nondistended; Bowel sounds present  NERVOUS SYSTEM:  Alert & Oriented X3,    EXTREMITIES:  2+ Peripheral Pulses, No clubbing, cyanosis, or edema  SKIN: warm dry                          12.4   3.69  )-----------( 99       ( 22 Jun 2021 05:55 )             36.8     06-22    139  |  104  |  2<L>  ----------------------------<  104<H>  4.2   |  26  |  0.55    Ca    9.3      22 Jun 2021 05:55  Phos  2.7     06-22  Mg     1.9     06-22    TPro  7.9  /  Alb  3.9  /  TBili  1.5<H>  /  DBili  0.7<H>  /  AST  159<H>  /  ALT  76<H>  /  AlkPhos  100  06-20    LIVER FUNCTIONS - ( 20 Jun 2021 23:32 )  Alb: 3.9 g/dL / Pro: 7.9 g/dL / ALK PHOS: 100 U/L / ALT: 76 U/L DA / AST: 159 U/L / GGT: x           CARDIAC MARKERS ( 20 Jun 2021 23:32 )  <0.015 ng/mL / x     / x     / x     / x              CAPILLARY BLOOD GLUCOSE      POCT Blood Glucose.: 104 mg/dL (22 Jun 2021 08:12)        Culture - Blood (collected 21 Jun 2021 08:21)  Source: .Blood Blood-Peripheral  Preliminary Report (22 Jun 2021 09:01):    No growth to date.    Culture - Blood (collected 21 Jun 2021 08:21)  Source: .Blood Blood-Peripheral  Preliminary Report (22 Jun 2021 09:01):    No growth to date.        RADIOLOGY & ADDITIONAL TESTS:

## 2021-06-23 LAB
ALBUMIN SERPL ELPH-MCNC: 3.5 G/DL — SIGNIFICANT CHANGE UP (ref 3.5–5)
ALP SERPL-CCNC: 85 U/L — SIGNIFICANT CHANGE UP (ref 40–120)
ALT FLD-CCNC: 83 U/L DA — HIGH (ref 10–60)
ANION GAP SERPL CALC-SCNC: 11 MMOL/L — SIGNIFICANT CHANGE UP (ref 5–17)
AST SERPL-CCNC: 165 U/L — HIGH (ref 10–40)
BILIRUB SERPL-MCNC: 1.3 MG/DL — HIGH (ref 0.2–1.2)
BUN SERPL-MCNC: 4 MG/DL — LOW (ref 7–18)
CALCIUM SERPL-MCNC: 9.5 MG/DL — SIGNIFICANT CHANGE UP (ref 8.4–10.5)
CHLORIDE SERPL-SCNC: 101 MMOL/L — SIGNIFICANT CHANGE UP (ref 96–108)
CO2 SERPL-SCNC: 24 MMOL/L — SIGNIFICANT CHANGE UP (ref 22–31)
CREAT SERPL-MCNC: 0.63 MG/DL — SIGNIFICANT CHANGE UP (ref 0.5–1.3)
CULTURE RESULTS: NO GROWTH — SIGNIFICANT CHANGE UP
GLUCOSE BLDC GLUCOMTR-MCNC: 101 MG/DL — HIGH (ref 70–99)
GLUCOSE BLDC GLUCOMTR-MCNC: 111 MG/DL — HIGH (ref 70–99)
GLUCOSE BLDC GLUCOMTR-MCNC: 117 MG/DL — HIGH (ref 70–99)
GLUCOSE SERPL-MCNC: 95 MG/DL — SIGNIFICANT CHANGE UP (ref 70–99)
HCT VFR BLD CALC: 36.6 % — LOW (ref 39–50)
HGB BLD-MCNC: 12.5 G/DL — LOW (ref 13–17)
MAGNESIUM SERPL-MCNC: 1.4 MG/DL — LOW (ref 1.6–2.6)
MCHC RBC-ENTMCNC: 33.2 PG — SIGNIFICANT CHANGE UP (ref 27–34)
MCHC RBC-ENTMCNC: 34.2 GM/DL — SIGNIFICANT CHANGE UP (ref 32–36)
MCV RBC AUTO: 97.3 FL — SIGNIFICANT CHANGE UP (ref 80–100)
NRBC # BLD: 0 /100 WBCS — SIGNIFICANT CHANGE UP (ref 0–0)
PHOSPHATE SERPL-MCNC: 2.6 MG/DL — SIGNIFICANT CHANGE UP (ref 2.5–4.5)
PLATELET # BLD AUTO: 122 K/UL — LOW (ref 150–400)
POTASSIUM SERPL-MCNC: 3.8 MMOL/L — SIGNIFICANT CHANGE UP (ref 3.5–5.3)
POTASSIUM SERPL-SCNC: 3.8 MMOL/L — SIGNIFICANT CHANGE UP (ref 3.5–5.3)
PROT SERPL-MCNC: 7.4 G/DL — SIGNIFICANT CHANGE UP (ref 6–8.3)
RBC # BLD: 3.76 M/UL — LOW (ref 4.2–5.8)
RBC # FLD: 11.4 % — SIGNIFICANT CHANGE UP (ref 10.3–14.5)
SODIUM SERPL-SCNC: 136 MMOL/L — SIGNIFICANT CHANGE UP (ref 135–145)
SPECIMEN SOURCE: SIGNIFICANT CHANGE UP
WBC # BLD: 5.16 K/UL — SIGNIFICANT CHANGE UP (ref 3.8–10.5)
WBC # FLD AUTO: 5.16 K/UL — SIGNIFICANT CHANGE UP (ref 3.8–10.5)

## 2021-06-23 PROCEDURE — 99291 CRITICAL CARE FIRST HOUR: CPT

## 2021-06-23 RX ORDER — CHLORHEXIDINE GLUCONATE 213 G/1000ML
1 SOLUTION TOPICAL
Refills: 0 | Status: DISCONTINUED | OUTPATIENT
Start: 2021-06-23 | End: 2021-06-28

## 2021-06-23 RX ORDER — MAGNESIUM SULFATE 500 MG/ML
2 VIAL (ML) INJECTION ONCE
Refills: 0 | Status: COMPLETED | OUTPATIENT
Start: 2021-06-23 | End: 2021-06-23

## 2021-06-23 RX ORDER — DEXMEDETOMIDINE HYDROCHLORIDE IN 0.9% SODIUM CHLORIDE 4 UG/ML
58 INJECTION INTRAVENOUS ONCE
Refills: 0 | Status: COMPLETED | OUTPATIENT
Start: 2021-06-23 | End: 2021-06-23

## 2021-06-23 RX ORDER — DEXMEDETOMIDINE HYDROCHLORIDE IN 0.9% SODIUM CHLORIDE 4 UG/ML
0.2 INJECTION INTRAVENOUS
Qty: 400 | Refills: 0 | Status: DISCONTINUED | OUTPATIENT
Start: 2021-06-23 | End: 2021-06-25

## 2021-06-23 RX ORDER — SODIUM CHLORIDE 9 MG/ML
1000 INJECTION, SOLUTION INTRAVENOUS
Refills: 0 | Status: DISCONTINUED | OUTPATIENT
Start: 2021-06-23 | End: 2021-06-24

## 2021-06-23 RX ORDER — PANTOPRAZOLE SODIUM 20 MG/1
40 TABLET, DELAYED RELEASE ORAL DAILY
Refills: 0 | Status: DISCONTINUED | OUTPATIENT
Start: 2021-06-23 | End: 2021-06-24

## 2021-06-23 RX ADMIN — Medication 4 MILLIGRAM(S): at 02:52

## 2021-06-23 RX ADMIN — Medication 4 MILLIGRAM(S): at 01:55

## 2021-06-23 RX ADMIN — Medication 2 MILLIGRAM(S): at 16:16

## 2021-06-23 RX ADMIN — Medication 50 GRAM(S): at 09:07

## 2021-06-23 RX ADMIN — Medication 105 MILLIGRAM(S): at 05:24

## 2021-06-23 RX ADMIN — ENOXAPARIN SODIUM 40 MILLIGRAM(S): 100 INJECTION SUBCUTANEOUS at 12:32

## 2021-06-23 RX ADMIN — SODIUM CHLORIDE 150 MILLILITER(S): 9 INJECTION, SOLUTION INTRAVENOUS at 09:18

## 2021-06-23 RX ADMIN — DEXMEDETOMIDINE HYDROCHLORIDE IN 0.9% SODIUM CHLORIDE 2.93 MICROGRAM(S)/KG/HR: 4 INJECTION INTRAVENOUS at 18:55

## 2021-06-23 RX ADMIN — Medication 2 MILLIGRAM(S): at 14:06

## 2021-06-23 RX ADMIN — Medication 1 PATCH: at 06:45

## 2021-06-23 RX ADMIN — Medication 4 MILLIGRAM(S): at 21:03

## 2021-06-23 RX ADMIN — Medication 105 MILLIGRAM(S): at 14:07

## 2021-06-23 RX ADMIN — Medication 2 MILLIGRAM(S): at 04:30

## 2021-06-23 RX ADMIN — Medication 105 MILLIGRAM(S): at 23:57

## 2021-06-23 RX ADMIN — CHLORHEXIDINE GLUCONATE 1 APPLICATION(S): 213 SOLUTION TOPICAL at 06:53

## 2021-06-23 RX ADMIN — Medication 2 MILLIGRAM(S): at 06:52

## 2021-06-23 RX ADMIN — Medication 2 MILLIGRAM(S): at 20:51

## 2021-06-23 RX ADMIN — Medication 2 MILLIGRAM(S): at 00:26

## 2021-06-23 RX ADMIN — Medication 2 MILLIGRAM(S): at 09:07

## 2021-06-23 RX ADMIN — Medication 2 MILLIGRAM(S): at 10:09

## 2021-06-23 RX ADMIN — Medication 2 MILLIGRAM(S): at 09:31

## 2021-06-23 RX ADMIN — Medication 1 PATCH: at 19:11

## 2021-06-23 RX ADMIN — Medication 4 MILLIGRAM(S): at 18:02

## 2021-06-23 RX ADMIN — Medication 1 PATCH: at 23:57

## 2021-06-23 RX ADMIN — Medication 2 MILLIGRAM(S): at 02:10

## 2021-06-23 RX ADMIN — DEXMEDETOMIDINE HYDROCHLORIDE IN 0.9% SODIUM CHLORIDE 2.93 MICROGRAM(S)/KG/HR: 4 INJECTION INTRAVENOUS at 06:52

## 2021-06-23 RX ADMIN — PANTOPRAZOLE SODIUM 40 MILLIGRAM(S): 20 TABLET, DELAYED RELEASE ORAL at 12:32

## 2021-06-23 RX ADMIN — FAMOTIDINE 20 MILLIGRAM(S): 10 INJECTION INTRAVENOUS at 06:52

## 2021-06-23 RX ADMIN — DEXMEDETOMIDINE HYDROCHLORIDE IN 0.9% SODIUM CHLORIDE 90.48 MICROGRAM(S): 4 INJECTION INTRAVENOUS at 07:13

## 2021-06-23 RX ADMIN — SODIUM CHLORIDE 75 MILLILITER(S): 9 INJECTION, SOLUTION INTRAVENOUS at 10:53

## 2021-06-23 NOTE — CONSULT NOTE ADULT - ASSESSMENT
36 year old man with PMH of chronic alcohol abuse brought in after a witnessed generalized seizure episode. Pt is admitted for alcohol withdrawals seizures and subclinical alcohol induced pancreatitis  Pt will be transferred to ICU     Assessment:  1.   4. elevated LFTs           Plan:    =====================[CNS ]==============================  #Alcohol withdrawal seizure without complication.  :   Admitted for alcohol withdrawal seizures  - C/w Ativan PRN 2q2h and Ativan 2q6h standing dose  - Bal < 3 on admission, last drink 2 days PTA   - c/w ciwa protocol  - c/w IV Thiamine, Folate, Multivitamins  - B12 & Folate levels wnl  - Will reduce ativan dosages as possible.  - EEG: No epileptiform pattern or seizure seen.  - Neuro Dr. Gaxiola consulted.     =====================[CVS ]===============================  # No active issues:   - BP currently acceptable   - monitor BP   -     =====================[RESP ]==============================  # No active issues:          =====================[ GI ]================================  Alcohol-induced acute pancreatitis, :   Lipase 1500  c/w IV fluids NS-D5 as pt was getting hypoglycemic  Diet advanced to clears  Pain management with ibuprofen prn   Pepcid 20 mg IV q 12hrs.         #elevated LFTs:   - Pt noted to have elevated LFT's now trending down  - likely in the setting of etoh abuse   - RUQ US negative.  - f/u LFTs daily           ====================[ RENAL ]=============================   #Acute Kidney Injury:   - Monitor renal function , intake and output             =====================[ ID ]================================  # UTI (urinary tract infection). :   UA positive  C/W rocephin             ===================[ HEME/ONC ]===========================  # No active issues:    =====================[ ENDO ]=============================  # No active issues:  - target CBG < 180  - c/w NPO and FSQ6     ================= Skin/Catheters============================  # No active issues   - No rashes.  - Gonzales     ==================[ PROPHYLAXIS ]==========================   # Dvt : Lovenox daily  # Gi : Famotidine    ====================[ DISPO ]==============================   - Monitor in ICU     36 year old man with PMH of chronic alcohol abuse brought in after a witnessed generalized seizure episode. Pt is admitted for alcohol withdrawals seizures and subclinical alcohol induced pancreatitis . RRT was called as pt was getting agitated and try to get out of bed CIWA is 16  .Pt will be transferred to ICU for Alcohol abuse can cause a lot of side effects in your body. It can cause cancer, liver failure, ulcer, pancreatitis, tremors, withdrawal effects, dependence, We strongly recommend to stop using alcohol, as it poses a lot of health effects withdrawal management and starting on Precedex drip     Assessment:  1. Alcohol withdrawal seizure without complication.  2. Alcohol-induced acute pancreatitis  3. elevated LFTs   4. UTI (urinary tract infection)        Plan:    =====================[CNS ]==============================  #Alcohol withdrawal seizure without complication.  :   - Admitted for alcohol withdrawal seizures  - C/w Ativan PRN 2q2h and Ativan 2q6h standing dose  - Bal < 3 on admission, last drink 2 days PTA   - c/w ciwa protocol  - c/w IV Thiamine, Folate, Multivitamins  - B12 & Folate levels wnl  - RRT was called for rising CIWA 17  - started on Precedex drip   - EEG: No epileptiform pattern or seizure seen.  - Neuro Dr. Gaxiola consulted.     =====================[CVS ]===============================  # No active issues:   - BP currently acceptable   - monitor BP   -     =====================[RESP ]==============================  # No active issues:          =====================[ GI ]================================  #Alcohol-induced acute pancreatitis, :   Lipase 1500  c/w IV fluids NS-D5 as pt was getting hypoglycemic  NPO for now   Pain management with ibuprofen prn   Pepcid 20 mg IV q 12hrs.         #elevated LFTs:   - Pt noted to have elevated LFT's now trending down  - likely in the setting of etoh abuse   - RUQ US negative.  - f/u LFTs daily           ====================[ RENAL ]=============================   # No active issues:  - Monitor renal function , intake and output             =====================[ ID ]================================  # UTI (urinary tract infection). :   UA positive  C/W rocephin             ===================[ HEME/ONC ]===========================  # No active issues:    =====================[ ENDO ]=============================  # No active issues:  - target CBG < 180  - c/w NPO and FSQ6     ================= Skin/Catheters============================  # No active issues   - No rashes.  - Gonzales     ==================[ PROPHYLAXIS ]==========================   # Dvt : Lovenox daily  # Gi : Famotidine    ====================[ DISPO ]==============================   - Monitor in ICU

## 2021-06-23 NOTE — PROGRESS NOTE ADULT - SUBJECTIVE AND OBJECTIVE BOX
INTERVAL HPI/ OVERNIGHT EVENTS: Admitted early in the morning for alcohol withdrawal requiring pressors. Spoke to the patient this am . AxO2. On Precedex drip, standing and prn ativan.   *Pt was super agitated around 9am and ended up getting 2 mg of ativan on top of prn and standing . After that pt was calmed down   *Will add Phenobarbital if DTs doesn't get controlled   *Fluid rate changes to 75cc/hr. Lipase is elevated high but no symptoms  *Famotidine oral changes to PPI   *Standing ativan changes to 4mgq4    PRESSORS: [ ] YES [x ] NO  WHICH:    ANTIBIOTICS:                      Antimicrobial:    Cardiovascular:    Pulmonary:    Hematalogic:  enoxaparin Injectable 40 milliGRAM(s) SubCutaneous daily    Other:  chlorhexidine 2% Cloths 1 Application(s) Topical <User Schedule>  dexMEDEtomidine Infusion 0.2 MICROgram(s)/kG/Hr IV Continuous <Continuous>  dextrose 50% Injectable 25 Gram(s) IV Push once  folic acid 1 milliGRAM(s) Oral daily  insulin lispro (ADMELOG) corrective regimen sliding scale   SubCutaneous every 6 hours  lactated ringers. 1000 milliLiter(s) IV Continuous <Continuous>  LORazepam   Injectable 4 milliGRAM(s) IV Push every 4 hours  LORazepam   Injectable 2 milliGRAM(s) IV Push every 2 hours PRN  multivitamin 1 Tablet(s) Oral daily  ondansetron Injectable 4 milliGRAM(s) IV Push every 6 hours PRN  pantoprazole  Injectable 40 milliGRAM(s) IV Push daily  thiamine IVPB 500 milliGRAM(s) IV Intermittent every 8 hours    chlorhexidine 2% Cloths 1 Application(s) Topical <User Schedule>  dexMEDEtomidine Infusion 0.2 MICROgram(s)/kG/Hr IV Continuous <Continuous>  dextrose 50% Injectable 25 Gram(s) IV Push once  enoxaparin Injectable 40 milliGRAM(s) SubCutaneous daily  folic acid 1 milliGRAM(s) Oral daily  insulin lispro (ADMELOG) corrective regimen sliding scale   SubCutaneous every 6 hours  lactated ringers. 1000 milliLiter(s) IV Continuous <Continuous>  LORazepam   Injectable 4 milliGRAM(s) IV Push every 4 hours  LORazepam   Injectable 2 milliGRAM(s) IV Push every 2 hours PRN  multivitamin 1 Tablet(s) Oral daily  ondansetron Injectable 4 milliGRAM(s) IV Push every 6 hours PRN  pantoprazole  Injectable 40 milliGRAM(s) IV Push daily  thiamine IVPB 500 milliGRAM(s) IV Intermittent every 8 hours    Drug Dosing Weight  Height (cm): 177.8 (20 Jun 2021 21:56)  Weight (kg): 57.8 (23 Jun 2021 06:45)  BMI (kg/m2): 18.3 (23 Jun 2021 06:45)  BSA (m2): 1.72 (23 Jun 2021 06:45)    CENTRAL LINE: [ ] YES [ x] NO  LOCATION:   DATE INSERTED:  REMOVE: [ ] YES [ ] NO  EXPLAIN:    REY: [ x] YES [ ] NO    DATE INSERTED:  REMOVE:  [ ] YES [ ] NO  EXPLAIN:    A-LINE:  [ ] YES [x ] NO  LOCATION:   DATE INSERTED:  REMOVE:  [ ] YES [ ] NO  EXPLAIN:    PMH -reviewed admission note, no change since admission    ICU Vital Signs Last 24 Hrs  T(C): 36.3 (23 Jun 2021 12:00), Max: 37.2 (23 Jun 2021 05:41)  T(F): 97.4 (23 Jun 2021 12:00), Max: 98.9 (23 Jun 2021 05:41)  HR: 73 (23 Jun 2021 12:00) (66 - 111)  BP: 127/95 (23 Jun 2021 12:00) (113/95 - 148/102)  BP(mean): 102 (23 Jun 2021 12:00) (96 - 111)  ABP: --  ABP(mean): --  RR: 15 (23 Jun 2021 12:00) (15 - 22)  SpO2: 100% (23 Jun 2021 12:00) (98% - 100%)            06-22 @ 07:01  -  06-23 @ 07:00  --------------------------------------------------------  IN: 152.9 mL / OUT: 1 mL / NET: 151.9 mL            PHYSICAL EXAM:    GENERAL: AxO2 on initial assessment restless  HEAD:  Atraumatic, Normocephalic  EYES: pupil responsive to light   ENMT: No tonsillar erythema, exudates, or enlargement; Moist mucous membranes, Good dentition, No lesions  NECK: Supple, normal appearance, No JVD; Normal thyroid; Trachea midline  NERVOUS SYSTEM:  Alert & Oriented X2, No focal neurologic deficit   CHEST/LUNG: No chest deformity; Normal percussion bilaterally; No rales, rhonchi, wheezing   HEART: Regular rate and rhythm; No murmurs, rubs, or gallops  ABDOMEN: Soft, Nontender, Nondistended; Bowel sounds present  EXTREMITIES:  2+ Peripheral Pulses, No clubbing, cyanosis, or edema  LYMPH: No lymphadenopathy noted  SKIN: No rashes or lesions; Good capillary refill      LABS:  CBC Full  -  ( 23 Jun 2021 06:19 )  WBC Count : 5.16 K/uL  RBC Count : 3.76 M/uL  Hemoglobin : 12.5 g/dL  Hematocrit : 36.6 %  Platelet Count - Automated : 122 K/uL  Mean Cell Volume : 97.3 fl  Mean Cell Hemoglobin : 33.2 pg  Mean Cell Hemoglobin Concentration : 34.2 gm/dL  Auto Neutrophil # : x  Auto Lymphocyte # : x  Auto Monocyte # : x  Auto Eosinophil # : x  Auto Basophil # : x  Auto Neutrophil % : x  Auto Lymphocyte % : x  Auto Monocyte % : x  Auto Eosinophil % : x  Auto Basophil % : x    06-23    136  |  101  |  4<L>  ----------------------------<  95  3.8   |  24  |  0.63    Ca    9.5      23 Jun 2021 06:19  Phos  2.6     06-23  Mg     1.4     06-23    TPro  7.4  /  Alb  3.5  /  TBili  1.3<H>  /  DBili  x   /  AST  165<H>  /  ALT  83<H>  /  AlkPhos  85  06-23        Culture Results:   No growth to date. (06-21 @ 08:21)  Culture Results:   No growth to date. (06-21 @ 08:21)      RADIOLOGY & ADDITIONAL STUDIES REVIEWED:  ***    GOALS OF CARE DISCUSSION WITH PATIENT/FAMILY/PROXY:    CRITICAL CARE TIME SPENT: 35 minutes

## 2021-06-23 NOTE — CONSULT NOTE ADULT - ATTENDING COMMENTS
Patient is a 37 y/o male with h/o alcohol abuse admitted yesterday with chief complaint of seizure. The patient' alcohol level was <3 mg/dl at that time. The patient was being treated on the medical floor when a rapid response was called due to the patient's difficulty being controlled during the treatment of his alcohol withdrawal. CIWA score was rising and he was accepted for transfer to the ICU for more rapid assessment and treatment and closer monitoring. Of note the patient has a lipase level of 896. I reviewed all laboratory and roentgenographic data and any previous medical record from [facility] that existed. I also discussed the case with the ED attending or referring physician.

## 2021-06-23 NOTE — CONSULT NOTE ADULT - SUBJECTIVE AND OBJECTIVE BOX
Patient is a 36y old  Male who presents with a chief complaint of seizure (22 Jun 2021 15:05)      INTERVAL HPI/OVERNIGHT EVENTS:  T(C): 36.8 (06-23-21 @ 01:55), Max: 37.1 (06-22-21 @ 05:51)  HR: 97 (06-23-21 @ 03:49) (77 - 105)  BP: 128/91 (06-23-21 @ 03:49) (114/85 - 128/91)  RR: 20 (06-23-21 @ 02:21) (18 - 20)  SpO2: 99% (06-23-21 @ 02:21) (96% - 100%)  Wt(kg): --  I&O's Summary    22 Jun 2021 07:01  -  23 Jun 2021 05:32  --------------------------------------------------------  IN: 150 mL / OUT: 1 mL / NET: 149 mL        PAST MEDICAL & SURGICAL HISTORY:  Alcohol abuse    No significant past surgical history        SOCIAL HISTORY  Alcohol:  Tobacco:  Illicit substance use:      FAMILY HISTORY:      LABS:                        12.4   3.69  )-----------( 99       ( 22 Jun 2021 05:55 )             36.8     06-22    139  |  104  |  2<L>  ----------------------------<  104<H>  4.2   |  26  |  0.55    Ca    9.3      22 Jun 2021 05:55  Phos  2.7     06-22  Mg     1.9     06-22    TPro  7.3  /  Alb  3.4<L>  /  TBili  1.3<H>  /  DBili  0.5<H>  /  AST  127<H>  /  ALT  68<H>  /  AlkPhos  81  06-22        CAPILLARY BLOOD GLUCOSE      POCT Blood Glucose.: 111 mg/dL (23 Jun 2021 00:31)  POCT Blood Glucose.: 129 mg/dL (22 Jun 2021 22:04)  POCT Blood Glucose.: 176 mg/dL (22 Jun 2021 16:52)  POCT Blood Glucose.: 95 mg/dL (22 Jun 2021 12:11)  POCT Blood Glucose.: 104 mg/dL (22 Jun 2021 08:12)  POCT Blood Glucose.: 115 mg/dL (22 Jun 2021 06:25)            MEDICATIONS  (STANDING):  cefTRIAXone   IVPB 1000 milliGRAM(s) IV Intermittent every 24 hours  dexMEDEtomidine Bolus 58 MICROGram(s) IV Bolus once  dexMEDEtomidine Infusion 0.2 MICROgram(s)/kG/Hr (2.93 mL/Hr) IV Continuous <Continuous>  dextrose 5% + sodium chloride 0.9%. 1000 milliLiter(s) (150 mL/Hr) IV Continuous <Continuous>  dextrose 50% Injectable 25 Gram(s) IV Push once  enoxaparin Injectable 40 milliGRAM(s) SubCutaneous daily  famotidine    Tablet 20 milliGRAM(s) Oral two times a day  folic acid 1 milliGRAM(s) Oral daily  insulin lispro (ADMELOG) corrective regimen sliding scale   SubCutaneous every 6 hours  LORazepam   Injectable 2 milliGRAM(s) IV Push every 4 hours  multivitamin 1 Tablet(s) Oral daily  thiamine IVPB 500 milliGRAM(s) IV Intermittent every 8 hours    MEDICATIONS  (PRN):  ibuprofen  Tablet. 400 milliGRAM(s) Oral every 6 hours PRN Mild Pain (1 - 3)  ibuprofen  Tablet. 600 milliGRAM(s) Oral every 8 hours PRN Moderate Pain (4 - 6)  LORazepam   Injectable 2 milliGRAM(s) IV Push every 2 hours PRN CIWA-Ar score 7 or greater  ondansetron Injectable 4 milliGRAM(s) IV Push every 6 hours PRN Nausea and/or Vomiting      REVIEW OF SYSTEMS:  CONSTITUTIONAL: No fever, weight loss, or fatigue  EYES: No eye pain, visual disturbances, or discharge  ENMT:  No difficulty hearing, tinnitus, vertigo; No sinus or throat pain  NECK: No pain or stiffness  RESPIRATORY: No cough, wheezing, chills or hemoptysis; No shortness of breath  CARDIOVASCULAR: No chest pain, palpitations, dizziness, or leg swelling  GASTROINTESTINAL: No abdominal or epigastric pain. No nausea, vomiting, or hematemesis; No diarrhea or constipation. No melena or hematochezia.  GENITOURINARY: No dysuria, frequency, hematuria, or incontinence  NEUROLOGICAL: No headaches, memory loss, loss of strength, numbness, or tremors  SKIN: No itching, burning, rashes, or lesions   LYMPH NODES: No enlarged glands  ENDOCRINE: No heat or cold intolerance; No hair loss  MUSCULOSKELETAL: No joint pain or swelling; No muscle, back, or extremity pain  PSYCHIATRIC: No depression, anxiety, mood swings, or difficulty sleeping  HEME/LYMPH: No easy bruising, or bleeding gums  ALLERY AND IMMUNOLOGIC: No hives or eczema    RADIOLOGY & ADDITIONAL TESTS:    Imaging Personally Reviewed:  [X] YES  [ ] NO    Consultant(s) Notes Reviewed:  [X] YES  [ ] NO    PHYSICAL EXAM:  GENERAL: NAD, well-groomed, well-developed  HEAD:  Atraumatic, Normocephalic  EYES: EOMI, PERRLA, conjunctiva and sclera clear  ENMT: No tonsillar erythema, exudates, or enlargement; Moist mucous membranes, Good dentition, No lesions  NECK: Supple, No JVD, Normal thyroid  NERVOUS SYSTEM:  Alert & Oriented X3, Good concentration; Motor Strength 5/5 B/L upper and lower extremities; DTRs 2+ intact and symmetric  CHEST/LUNG: Clear to percussion bilaterally; No rales, rhonchi, wheezing, or rubs  HEART: Regular rate and rhythm; No murmurs, rubs, or gallops  ABDOMEN: Soft, Nontender, Nondistended; Bowel sounds present  EXTREMITIES:  2+ Peripheral Pulses, No clubbing, cyanosis, or edema  LYMPH: No lymphadenopathy noted  SKIN: No rashes or lesions    Care Discussed with Consultants/Other Providers [X] YES  [ ] NO  36 year old man with PMH of chronic alcohol abuse brought in after a witnessed generalized seizure episode. Pt is admitted for alcohol withdrawals seizures and subclinical alcohol induced pancreatitis . RRT was called as pt was getting agitated and try to get out of bed CIWA is 16  .Pt will be transferred to ICU for Alcohol abuse can cause a lot of side effects in your body. It can cause cancer, liver failure, ulcer, pancreatitis, tremors, withdrawal effects, dependence, We strongly recommend to stop using alcohol, as it poses a lot of health effects withdrawal management and starting on Precedex drip  (22 Jun 2021 15:05)      INTERVAL HPI/OVERNIGHT EVENTS:  T(C): 36.8 (06-23-21 @ 01:55), Max: 37.1 (06-22-21 @ 05:51)  HR: 97 (06-23-21 @ 03:49) (77 - 105)  BP: 128/91 (06-23-21 @ 03:49) (114/85 - 128/91)  RR: 20 (06-23-21 @ 02:21) (18 - 20)  SpO2: 99% (06-23-21 @ 02:21) (96% - 100%)  Wt(kg): --  I&O's Summary    22 Jun 2021 07:01  -  23 Jun 2021 05:32  --------------------------------------------------------  IN: 150 mL / OUT: 1 mL / NET: 149 mL        PAST MEDICAL & SURGICAL HISTORY:  Alcohol abuse    No significant past surgical history        SOCIAL HISTORY  Alcohol:  Tobacco:  Illicit substance use:      FAMILY HISTORY:      LABS:                        12.4   3.69  )-----------( 99       ( 22 Jun 2021 05:55 )             36.8     06-22    139  |  104  |  2<L>  ----------------------------<  104<H>  4.2   |  26  |  0.55    Ca    9.3      22 Jun 2021 05:55  Phos  2.7     06-22  Mg     1.9     06-22    TPro  7.3  /  Alb  3.4<L>  /  TBili  1.3<H>  /  DBili  0.5<H>  /  AST  127<H>  /  ALT  68<H>  /  AlkPhos  81  06-22        CAPILLARY BLOOD GLUCOSE      POCT Blood Glucose.: 111 mg/dL (23 Jun 2021 00:31)  POCT Blood Glucose.: 129 mg/dL (22 Jun 2021 22:04)  POCT Blood Glucose.: 176 mg/dL (22 Jun 2021 16:52)  POCT Blood Glucose.: 95 mg/dL (22 Jun 2021 12:11)  POCT Blood Glucose.: 104 mg/dL (22 Jun 2021 08:12)  POCT Blood Glucose.: 115 mg/dL (22 Jun 2021 06:25)            MEDICATIONS  (STANDING):  cefTRIAXone   IVPB 1000 milliGRAM(s) IV Intermittent every 24 hours  dexMEDEtomidine Bolus 58 MICROGram(s) IV Bolus once  dexMEDEtomidine Infusion 0.2 MICROgram(s)/kG/Hr (2.93 mL/Hr) IV Continuous <Continuous>  dextrose 5% + sodium chloride 0.9%. 1000 milliLiter(s) (150 mL/Hr) IV Continuous <Continuous>  dextrose 50% Injectable 25 Gram(s) IV Push once  enoxaparin Injectable 40 milliGRAM(s) SubCutaneous daily  famotidine    Tablet 20 milliGRAM(s) Oral two times a day  folic acid 1 milliGRAM(s) Oral daily  insulin lispro (ADMELOG) corrective regimen sliding scale   SubCutaneous every 6 hours  LORazepam   Injectable 2 milliGRAM(s) IV Push every 4 hours  multivitamin 1 Tablet(s) Oral daily  thiamine IVPB 500 milliGRAM(s) IV Intermittent every 8 hours    MEDICATIONS  (PRN):  ibuprofen  Tablet. 400 milliGRAM(s) Oral every 6 hours PRN Mild Pain (1 - 3)  ibuprofen  Tablet. 600 milliGRAM(s) Oral every 8 hours PRN Moderate Pain (4 - 6)  LORazepam   Injectable 2 milliGRAM(s) IV Push every 2 hours PRN CIWA-Ar score 7 or greater  ondansetron Injectable 4 milliGRAM(s) IV Push every 6 hours PRN Nausea and/or Vomiting      REVIEW OF SYSTEMS:  CONSTITUTIONAL: No fever, weight loss, or fatigue  EYES: No eye pain, visual disturbances, or discharge  ENMT:  No difficulty hearing, tinnitus, vertigo; No sinus or throat pain  NECK: No pain or stiffness  RESPIRATORY: No cough, wheezing, chills or hemoptysis; No shortness of breath  CARDIOVASCULAR: No chest pain, palpitations, dizziness, or leg swelling  GASTROINTESTINAL: No abdominal or epigastric pain. No nausea, vomiting, or hematemesis; No diarrhea or constipation. No melena or hematochezia.  GENITOURINARY: No dysuria, frequency, hematuria, or incontinence  NEUROLOGICAL: No headaches, memory loss, loss of strength, numbness, or tremors  SKIN: No itching, burning, rashes, or lesions   LYMPH NODES: No enlarged glands  ENDOCRINE: No heat or cold intolerance; No hair loss  MUSCULOSKELETAL: No joint pain or swelling; No muscle, back, or extremity pain  PSYCHIATRIC: No depression, anxiety, mood swings, or difficulty sleeping  HEME/LYMPH: No easy bruising, or bleeding gums  ALLERY AND IMMUNOLOGIC: No hives or eczema    RADIOLOGY & ADDITIONAL TESTS:    Imaging Personally Reviewed:  [X] YES  [ ] NO    Consultant(s) Notes Reviewed:  [X] YES  [ ] NO    PHYSICAL EXAM:  GENERAL: NAD, well-groomed, well-developed  HEAD:  Atraumatic, Normocephalic  EYES: EOMI, PERRLA, conjunctiva and sclera clear  ENMT: No tonsillar erythema, exudates, or enlargement; Moist mucous membranes, Good dentition, No lesions  NECK: Supple, No JVD, Normal thyroid  NERVOUS SYSTEM:  Alert & Oriented X3, agitated poor concentration; lack of insight  Motor Strength 5/5 B/L upper and lower extremities; DTRs 2+ intact and symmetric  CHEST/LUNG: Clear to percussion bilaterally; No rales, rhonchi, wheezing, or rubs  HEART: Regular rate and rhythm; No murmurs, rubs, or gallops  ABDOMEN: Soft, Nontender, Nondistended; Bowel sounds present  EXTREMITIES:  2+ Peripheral Pulses, No clubbing, cyanosis, or edema  LYMPH: No lymphadenopathy noted  SKIN: No rashes or lesions    Care Discussed with Consultants/Other Providers [X] YES  [ ] NO

## 2021-06-23 NOTE — CHART NOTE - NSCHARTNOTEFT_GEN_A_CORE
Updated Family at bedside for patient's current status. Explained at length the complications of alcohol abuse and also counseled on alcohol rehab/program on discharge as pt is actively trying to quit alcohol.  consult while he would be DG to floor.

## 2021-06-23 NOTE — PROGRESS NOTE ADULT - ASSESSMENT
36 year old man with PMH of chronic alcohol abuse brought in after a witnessed generalized seizure episode. Pt is admitted for alcohol withdrawals seizures and subclinical alcohol induced pancreatitis . RRT was called as pt was getting agitated and try to get out of bed CIWA is 16  .Pt will be transferred to ICU for Alcohol abuse and starting on Precedex drip     Assessment:  1. Alcohol withdrawal seizure without complication.  3. Elevated LFTs and lipase       Plan:    =====================[CNS ]==============================  #Alcohol withdrawal seizure without complication.  :   - Admitted for alcohol withdrawal seizures  - Ativan PRN 2q2h and Ativan 4q4h standing dose  - Bal < 3 on admission, last drink 2 days PTA   - c/w ciwa protocol  - c/w IV Thiamine, Folate, Multivitamins  - B12 & Folate levels wnl  - Precedex drip   - EEG: No epileptiform pattern or seizure seen.  - Neuro Dr. Gaxiola consulted.     =====================[CVS ]===============================  # No active issues:   - BP currently acceptable   - monitor BP   -     =====================[RESP ]==============================  # No active issues:          =====================[ GI ]================================  Lipase 1500  No abdominal pain   Doesn't meet criteria for acute pancreatitis   NPO for now   LR 75cc/hr   PPI IV      #elevated LFTs:   - Pt noted to have elevated LFT's now trending down  - likely in the setting of etoh abuse   - RUQ US negative.  - f/u LFTs daily           ====================[ RENAL ]=============================   # No active issues:  - Monitor renal function , intake and output             =====================[ ID ]================================  UA negative   dc'ed Rocephin       ===================[ HEME/ONC ]===========================  # No active issues:    =====================[ ENDO ]=============================  # No active issues:  - target CBG < 180  - c/w NPO and FSQ6     ================= Skin/Catheters============================  # No active issues   - No rashes.  - Gonzales     ==================[ PROPHYLAXIS ]==========================   # Dvt : Lovenox daily  # Gi : PPI    ====================[ DISPO ]==============================   - Monitor in ICU

## 2021-06-23 NOTE — RAPID RESPONSE TEAM SUMMARY - NSSITUATIONBACKGROUNDRRT_GEN_ALL_CORE
RRT was called as pt was getting agitated and try to get out of bed   CIWA is 16   pt was given 4 mg Ativan prior to calling RRT then was given 2 mg Ativan   Pt is on now constant observation   changed Ativan to 2 mg q4 standing

## 2021-06-24 LAB
ALBUMIN SERPL ELPH-MCNC: 3.4 G/DL — LOW (ref 3.5–5)
ALP SERPL-CCNC: 90 U/L — SIGNIFICANT CHANGE UP (ref 40–120)
ALT FLD-CCNC: 165 U/L DA — HIGH (ref 10–60)
AMPHET UR-MCNC: NEGATIVE — SIGNIFICANT CHANGE UP
ANION GAP SERPL CALC-SCNC: 9 MMOL/L — SIGNIFICANT CHANGE UP (ref 5–17)
AST SERPL-CCNC: 344 U/L — HIGH (ref 10–40)
BARBITURATES, URINE.: NEGATIVE — SIGNIFICANT CHANGE UP
BENZODIAZ UR-MCNC: NEGATIVE — SIGNIFICANT CHANGE UP
BILIRUB SERPL-MCNC: 1.4 MG/DL — HIGH (ref 0.2–1.2)
BUN SERPL-MCNC: 3 MG/DL — LOW (ref 7–18)
CALCIUM SERPL-MCNC: 8.7 MG/DL — SIGNIFICANT CHANGE UP (ref 8.4–10.5)
CHLORIDE SERPL-SCNC: 104 MMOL/L — SIGNIFICANT CHANGE UP (ref 96–108)
CO2 SERPL-SCNC: 26 MMOL/L — SIGNIFICANT CHANGE UP (ref 22–31)
COCAINE METAB.OTHER UR-MCNC: NEGATIVE — SIGNIFICANT CHANGE UP
CREAT SERPL-MCNC: 0.45 MG/DL — LOW (ref 0.5–1.3)
CREATININE, URINE THERAPEUTIC: 24.8 MG/DL — SIGNIFICANT CHANGE UP
GLUCOSE SERPL-MCNC: 96 MG/DL — SIGNIFICANT CHANGE UP (ref 70–99)
HCT VFR BLD CALC: 39 % — SIGNIFICANT CHANGE UP (ref 39–50)
HGB BLD-MCNC: 13.6 G/DL — SIGNIFICANT CHANGE UP (ref 13–17)
MAGNESIUM SERPL-MCNC: 1.9 MG/DL — SIGNIFICANT CHANGE UP (ref 1.6–2.6)
MCHC RBC-ENTMCNC: 33.7 PG — SIGNIFICANT CHANGE UP (ref 27–34)
MCHC RBC-ENTMCNC: 34.9 GM/DL — SIGNIFICANT CHANGE UP (ref 32–36)
MCV RBC AUTO: 96.5 FL — SIGNIFICANT CHANGE UP (ref 80–100)
METHADONE UR-MCNC: NEGATIVE — SIGNIFICANT CHANGE UP
METHAQUALONE UR QL: NEGATIVE — SIGNIFICANT CHANGE UP
METHAQUALONE UR-MCNC: NEGATIVE — SIGNIFICANT CHANGE UP
NRBC # BLD: 0 /100 WBCS — SIGNIFICANT CHANGE UP (ref 0–0)
OPIATES UR-MCNC: NEGATIVE — SIGNIFICANT CHANGE UP
PCP UR-MCNC: NEGATIVE — SIGNIFICANT CHANGE UP
PHOSPHATE SERPL-MCNC: 4.8 MG/DL — HIGH (ref 2.5–4.5)
PLATELET # BLD AUTO: 135 K/UL — LOW (ref 150–400)
POTASSIUM SERPL-MCNC: 3.5 MMOL/L — SIGNIFICANT CHANGE UP (ref 3.5–5.3)
POTASSIUM SERPL-SCNC: 3.5 MMOL/L — SIGNIFICANT CHANGE UP (ref 3.5–5.3)
PROPOXYPH UR QL: NEGATIVE — SIGNIFICANT CHANGE UP
PROT SERPL-MCNC: 7.2 G/DL — SIGNIFICANT CHANGE UP (ref 6–8.3)
RBC # BLD: 4.04 M/UL — LOW (ref 4.2–5.8)
RBC # FLD: 11.2 % — SIGNIFICANT CHANGE UP (ref 10.3–14.5)
SODIUM SERPL-SCNC: 139 MMOL/L — SIGNIFICANT CHANGE UP (ref 135–145)
THC UR QL: NEGATIVE — SIGNIFICANT CHANGE UP
WBC # BLD: 5.24 K/UL — SIGNIFICANT CHANGE UP (ref 3.8–10.5)
WBC # FLD AUTO: 5.24 K/UL — SIGNIFICANT CHANGE UP (ref 3.8–10.5)

## 2021-06-24 RX ORDER — ACETAMINOPHEN 500 MG
650 TABLET ORAL EVERY 8 HOURS
Refills: 0 | Status: DISCONTINUED | OUTPATIENT
Start: 2021-06-24 | End: 2021-06-28

## 2021-06-24 RX ORDER — SODIUM CHLORIDE 9 MG/ML
1000 INJECTION, SOLUTION INTRAVENOUS
Refills: 0 | Status: DISCONTINUED | OUTPATIENT
Start: 2021-06-24 | End: 2021-06-25

## 2021-06-24 RX ORDER — SODIUM CHLORIDE 9 MG/ML
1000 INJECTION, SOLUTION INTRAVENOUS
Refills: 0 | Status: DISCONTINUED | OUTPATIENT
Start: 2021-06-24 | End: 2021-06-24

## 2021-06-24 RX ORDER — PANTOPRAZOLE SODIUM 20 MG/1
40 TABLET, DELAYED RELEASE ORAL
Refills: 0 | Status: DISCONTINUED | OUTPATIENT
Start: 2021-06-24 | End: 2021-06-28

## 2021-06-24 RX ORDER — PANTOPRAZOLE SODIUM 20 MG/1
40 TABLET, DELAYED RELEASE ORAL
Refills: 0 | Status: DISCONTINUED | OUTPATIENT
Start: 2021-06-24 | End: 2021-06-24

## 2021-06-24 RX ADMIN — Medication 105 MILLIGRAM(S): at 13:34

## 2021-06-24 RX ADMIN — Medication 4 MILLIGRAM(S): at 10:15

## 2021-06-24 RX ADMIN — SODIUM CHLORIDE 75 MILLILITER(S): 9 INJECTION, SOLUTION INTRAVENOUS at 10:55

## 2021-06-24 RX ADMIN — Medication 4 MILLIGRAM(S): at 18:00

## 2021-06-24 RX ADMIN — DEXMEDETOMIDINE HYDROCHLORIDE IN 0.9% SODIUM CHLORIDE 2.93 MICROGRAM(S)/KG/HR: 4 INJECTION INTRAVENOUS at 05:06

## 2021-06-24 RX ADMIN — Medication 1 TABLET(S): at 12:18

## 2021-06-24 RX ADMIN — ENOXAPARIN SODIUM 40 MILLIGRAM(S): 100 INJECTION SUBCUTANEOUS at 12:20

## 2021-06-24 RX ADMIN — Medication 2 MILLIGRAM(S): at 08:35

## 2021-06-24 RX ADMIN — CHLORHEXIDINE GLUCONATE 1 APPLICATION(S): 213 SOLUTION TOPICAL at 05:03

## 2021-06-24 RX ADMIN — Medication 650 MILLIGRAM(S): at 14:06

## 2021-06-24 RX ADMIN — Medication 650 MILLIGRAM(S): at 14:04

## 2021-06-24 RX ADMIN — Medication 4 MILLIGRAM(S): at 05:42

## 2021-06-24 RX ADMIN — Medication 1 MILLIGRAM(S): at 12:18

## 2021-06-24 RX ADMIN — Medication 105 MILLIGRAM(S): at 05:03

## 2021-06-24 RX ADMIN — Medication 4 MILLIGRAM(S): at 21:01

## 2021-06-24 RX ADMIN — Medication 4 MILLIGRAM(S): at 02:37

## 2021-06-24 RX ADMIN — Medication 4 MILLIGRAM(S): at 14:06

## 2021-06-24 NOTE — PHYSICAL THERAPY INITIAL EVALUATION ADULT - DISCHARGE DISPOSITION, PT EVAL
Detail Level: Detailed Detail Level: Zone Include Location In Plan?: Yes Detail Level: Generalized No PT Needs at d/c; Will f/u in hospital Quality 137: Melanoma: Continuity Of Care - Recall System: Patient information entered into a recall system that includes: target date for the next exam specified AND a process to follow up with patients regarding missed or unscheduled appointments Hide Additional Notes?: No

## 2021-06-24 NOTE — PROGRESS NOTE ADULT - ASSESSMENT
36 year old man with PMH of chronic alcohol abuse brought in after a witnessed generalized seizure episode. Pt is admitted for alcohol withdrawals seizures and subclinical alcohol induced pancreatitis . RRT was called as pt was getting agitated and try to get out of bed CIWA is 16  .Pt will be transferred to ICU for Alcohol abuse and starting on Precedex drip     Assessment:  1. Alcohol withdrawal seizure without complication.  3. Elevated LFTs and lipase       Plan:    =====================[CNS ]==============================  #Alcohol withdrawal seizure without complication.  :   - Admitted for alcohol withdrawal seizures  - Ativan PRN 2q2h and Ativan 4q4h standing dose  - Bal < 3 on admission, last drink 2 days PTA   - c/w ciwa protocol  - c/w IV Thiamine, Folate, Multivitamins  - B12 & Folate levels wnl  - Precedex drip   - EEG: No epileptiform pattern or seizure seen.  - Neuro Dr. Gaxiola consulted.     =====================[CVS ]===============================  # No active issues:   - BP currently acceptable   - monitor BP   -     =====================[RESP ]==============================  # No active issues:          =====================[ GI ]================================  Lipase 1500  No abdominal pain   Doesn't meet criteria for acute pancreatitis   NPO for now   LR 75cc/hr   PPI IV      #elevated LFTs:   - Pt noted to have elevated LFT's now trending down  - likely in the setting of etoh abuse   - RUQ US negative.  - f/u LFTs daily           ====================[ RENAL ]=============================   # No active issues:  - Monitor renal function , intake and output             =====================[ ID ]================================  UA negative   dc'ed Rocephin       ===================[ HEME/ONC ]===========================  # No active issues:    =====================[ ENDO ]=============================  # No active issues:  - target CBG < 180  - c/w NPO and FSQ6     ================= Skin/Catheters============================  # No active issues   - No rashes.  - Gonzales     ==================[ PROPHYLAXIS ]==========================   # Dvt : Lovenox daily  # Gi : PPI    ====================[ DISPO ]==============================   - Monitor in ICU     36 year old man with PMH of chronic alcohol abuse brought in after a witnessed generalized seizure episode. Pt is admitted for alcohol withdrawals seizures and subclinical alcohol induced pancreatitis . RRT was called as pt was getting agitated and try to get out of bed CIWA is 16  .Pt will be transferred to ICU for Alcohol abuse and starting on Precedex drip     Assessment:  1. Alcohol withdrawal seizure without complication.  2. Alcohol abuse   3. Elevated LFTs and lipase       Plan:    =====================[CNS ]==============================  #Alcohol withdrawal seizure without complication.  :   - Admitted for alcohol withdrawal seizures  - Ativan PRN 2q2h and Ativan 4q4h standing dose  - Bal < 3 on admission, last drink 2 days PTA   - c/w ciwa protocol  - c/w IV Thiamine, Folate, Multivitamins  - B12 & Folate levels wnl  - Precedex drip titrating down  - EEG: No epileptiform pattern or seizure seen.  - Neuro Dr. Gaxiola consulted.     =====================[CVS ]===============================  # No active issues:   - BP currently acceptable   - monitor BP   -     =====================[RESP ]==============================  # No active issues:        =====================[ GI ]================================  Lipase 1500  No abdominal pain   Doesn't meet criteria for acute pancreatitis   D5 LR 75cc/hr   PPI IV switch to PO  Starting on diet       #elevated LFTs:   - Pt noted to have elevated LFT's trends up today   - likely in the setting of etoh abuse   - T Bili 1. Direct bili 0.5  - RUQ US negative for cholelithiasis or CBD dilation   - f/u LFTs daily           ====================[ RENAL ]=============================   # No active issues:  - Monitor renal function , intake and output             =====================[ ID ]================================  UA negative   dc'ed Rocephin       ===================[ HEME/ONC ]===========================  # No active issues:    =====================[ ENDO ]=============================  # No active issues:  -dc'ed FS as pt is not diabetic and started on diet       ================= Skin/Catheters============================  # No active issues   - No rashes.  - Gonzales     ==================[ PROPHYLAXIS ]==========================   # Dvt : Lovenox daily  # Gi : PPI    ====================[ DISPO ]==============================   - Monitor in ICU

## 2021-06-24 NOTE — PHARMACOTHERAPY INTERVENTION NOTE - COMMENTS
Recommended switching pantoprazole from IV to PO due to patient’s diet and oral intake of medications.

## 2021-06-24 NOTE — PHYSICAL THERAPY INITIAL EVALUATION ADULT - PLANNED THERAPY INTERVENTIONS, PT EVAL
balance training/gait training/neuromuscular re-education/postural re-education/strengthening/transfer training

## 2021-06-24 NOTE — PHYSICAL THERAPY INITIAL EVALUATION ADULT - IMPAIRMENTS FOUND, PT EVAL
arousal, attention, and cognition/gait, locomotion, and balance/muscle strength/poor safety awareness/ROM

## 2021-06-24 NOTE — PHYSICAL THERAPY INITIAL EVALUATION ADULT - FUNCTIONAL LIMITATIONS, PT EVAL
home management/community/leisure Milo stage 1 No costovertebral angle tenderness/Normal external genitalia

## 2021-06-24 NOTE — PHYSICAL THERAPY INITIAL EVALUATION ADULT - GENERAL OBSERVATIONS, REHAB EVAL
Consult received, chart reviewed. Patient received supine in bed, NAD, + IV, parra, + Cardiac monitoring. RASS -1; CAM ICU (-) Patient agreed to EVALUATION from Physical Therapist. Mother bedside

## 2021-06-24 NOTE — PROGRESS NOTE ADULT - SUBJECTIVE AND OBJECTIVE BOX
INTERVAL HPI/OVERNIGHT EVENTS: ***    PRESSORS: [ ] YES [ ] NO  WHICH:    ANTIBIOTICS:                      Antimicrobial:    Cardiovascular:    Pulmonary:    Hematalogic:  enoxaparin Injectable 40 milliGRAM(s) SubCutaneous daily    Other:  chlorhexidine 2% Cloths 1 Application(s) Topical <User Schedule>  dexMEDEtomidine Infusion 0.2 MICROgram(s)/kG/Hr IV Continuous <Continuous>  dextrose 5% + sodium chloride 0.9%. 1000 milliLiter(s) IV Continuous <Continuous>  dextrose 50% Injectable 25 Gram(s) IV Push once  folic acid 1 milliGRAM(s) Oral daily  insulin lispro (ADMELOG) corrective regimen sliding scale   SubCutaneous every 6 hours  LORazepam   Injectable 4 milliGRAM(s) IV Push every 4 hours  LORazepam   Injectable 2 milliGRAM(s) IV Push every 2 hours PRN  multivitamin 1 Tablet(s) Oral daily  ondansetron Injectable 4 milliGRAM(s) IV Push every 6 hours PRN  pantoprazole    Tablet 40 milliGRAM(s) Oral before breakfast  thiamine IVPB 500 milliGRAM(s) IV Intermittent every 8 hours    chlorhexidine 2% Cloths 1 Application(s) Topical <User Schedule>  dexMEDEtomidine Infusion 0.2 MICROgram(s)/kG/Hr IV Continuous <Continuous>  dextrose 5% + sodium chloride 0.9%. 1000 milliLiter(s) IV Continuous <Continuous>  dextrose 50% Injectable 25 Gram(s) IV Push once  enoxaparin Injectable 40 milliGRAM(s) SubCutaneous daily  folic acid 1 milliGRAM(s) Oral daily  insulin lispro (ADMELOG) corrective regimen sliding scale   SubCutaneous every 6 hours  LORazepam   Injectable 4 milliGRAM(s) IV Push every 4 hours  LORazepam   Injectable 2 milliGRAM(s) IV Push every 2 hours PRN  multivitamin 1 Tablet(s) Oral daily  ondansetron Injectable 4 milliGRAM(s) IV Push every 6 hours PRN  pantoprazole    Tablet 40 milliGRAM(s) Oral before breakfast  thiamine IVPB 500 milliGRAM(s) IV Intermittent every 8 hours    Drug Dosing Weight  Height (cm): 177.8 (20 Jun 2021 21:56)  Weight (kg): 57.8 (23 Jun 2021 06:45)  BMI (kg/m2): 18.3 (23 Jun 2021 06:45)  BSA (m2): 1.72 (23 Jun 2021 06:45)    CENTRAL LINE: [ ] YES [ ] NO  LOCATION:   DATE INSERTED:  REMOVE: [ ] YES [ ] NO  EXPLAIN:    REY: [ ] YES [ ] NO    DATE INSERTED:  REMOVE:  [ ] YES [ ] NO  EXPLAIN:    A-LINE:  [ ] YES [ ] NO  LOCATION:   DATE INSERTED:  REMOVE:  [ ] YES [ ] NO  EXPLAIN:    PMH -reviewed admission note, no change since admission    ICU Vital Signs Last 24 Hrs  T(C): 36.4 (24 Jun 2021 08:00), Max: 36.4 (24 Jun 2021 08:00)  T(F): 97.6 (24 Jun 2021 08:00), Max: 97.6 (24 Jun 2021 08:00)  HR: 81 (24 Jun 2021 09:00) (66 - 86)  BP: 115/91 (24 Jun 2021 09:00) (115/88 - 154/107)  BP(mean): 96 (24 Jun 2021 09:00) (94 - 119)  ABP: --  ABP(mean): --  RR: 16 (24 Jun 2021 09:00) (13 - 18)  SpO2: 100% (24 Jun 2021 09:00) (100% - 100%)            06-23 @ 07:01  -  06-24 @ 07:00  --------------------------------------------------------  IN: 2551.2 mL / OUT: 1952 mL / NET: 599.2 mL            PHYSICAL EXAM:    GENERAL: NAD, well-groomed, well-developed  HEAD:  Atraumatic, Normocephalic  EYES: EOMI, PERRLA, conjunctiva and sclera clear  ENMT: No tonsillar erythema, exudates, or enlargement; Moist mucous membranes, Good dentition, No lesions  NECK: Supple, normal appearance, No JVD; Normal thyroid; Trachea midline  NERVOUS SYSTEM:  Alert & Oriented X3, Good concentration; Motor Strength 5/5 B/L upper and lower extremities; DTRs 2+ intact and symmetric  CHEST/LUNG: No chest deformity; Normal percussion bilaterally; No rales, rhonchi, wheezing   HEART: Regular rate and rhythm; No murmurs, rubs, or gallops  ABDOMEN: Soft, Nontender, Nondistended; Bowel sounds present  EXTREMITIES:  2+ Peripheral Pulses, No clubbing, cyanosis, or edema  LYMPH: No lymphadenopathy noted  SKIN: No rashes or lesions; Good capillary refill      LABS:  CBC Full  -  ( 24 Jun 2021 04:20 )  WBC Count : 5.24 K/uL  RBC Count : 4.04 M/uL  Hemoglobin : 13.6 g/dL  Hematocrit : 39.0 %  Platelet Count - Automated : 135 K/uL  Mean Cell Volume : 96.5 fl  Mean Cell Hemoglobin : 33.7 pg  Mean Cell Hemoglobin Concentration : 34.9 gm/dL  Auto Neutrophil # : x  Auto Lymphocyte # : x  Auto Monocyte # : x  Auto Eosinophil # : x  Auto Basophil # : x  Auto Neutrophil % : x  Auto Lymphocyte % : x  Auto Monocyte % : x  Auto Eosinophil % : x  Auto Basophil % : x    06-24    139  |  104  |  3<L>  ----------------------------<  96  3.5   |  26  |  0.45<L>    Ca    8.7      24 Jun 2021 04:20  Phos  4.8     06-24  Mg     1.9     06-24    TPro  7.2  /  Alb  3.4<L>  /  TBili  1.4<H>  /  DBili  x   /  AST  344<H>  /  ALT  165<H>  /  AlkPhos  90  06-24        Culture Results:   No growth (06-22 @ 22:10)      RADIOLOGY & ADDITIONAL STUDIES REVIEWED:  ***    GOALS OF CARE DISCUSSION WITH PATIENT/FAMILY/PROXY:    CRITICAL CARE TIME SPENT: 35 minutes INTERVAL HPI/OVERNIGHT EVENTS: Pt with sugar in 70s. IVF changed to D5 NS. Changed to D5 LR this am. Titrating off Precedex. Starting diet. Will continue with standing and PRN ativan     PRESSORS: [ ] YES [ x] NO  WHICH:    ANTIBIOTICS:                      Antimicrobial:    Cardiovascular:    Pulmonary:    Hematalogic:  enoxaparin Injectable 40 milliGRAM(s) SubCutaneous daily    Other:  chlorhexidine 2% Cloths 1 Application(s) Topical <User Schedule>  dexMEDEtomidine Infusion 0.2 MICROgram(s)/kG/Hr IV Continuous <Continuous>  dextrose 5% + sodium chloride 0.9%. 1000 milliLiter(s) IV Continuous <Continuous>  dextrose 50% Injectable 25 Gram(s) IV Push once  folic acid 1 milliGRAM(s) Oral daily  insulin lispro (ADMELOG) corrective regimen sliding scale   SubCutaneous every 6 hours  LORazepam   Injectable 4 milliGRAM(s) IV Push every 4 hours  LORazepam   Injectable 2 milliGRAM(s) IV Push every 2 hours PRN  multivitamin 1 Tablet(s) Oral daily  ondansetron Injectable 4 milliGRAM(s) IV Push every 6 hours PRN  pantoprazole    Tablet 40 milliGRAM(s) Oral before breakfast  thiamine IVPB 500 milliGRAM(s) IV Intermittent every 8 hours    chlorhexidine 2% Cloths 1 Application(s) Topical <User Schedule>  dexMEDEtomidine Infusion 0.2 MICROgram(s)/kG/Hr IV Continuous <Continuous>  dextrose 5% + sodium chloride 0.9%. 1000 milliLiter(s) IV Continuous <Continuous>  dextrose 50% Injectable 25 Gram(s) IV Push once  enoxaparin Injectable 40 milliGRAM(s) SubCutaneous daily  folic acid 1 milliGRAM(s) Oral daily  insulin lispro (ADMELOG) corrective regimen sliding scale   SubCutaneous every 6 hours  LORazepam   Injectable 4 milliGRAM(s) IV Push every 4 hours  LORazepam   Injectable 2 milliGRAM(s) IV Push every 2 hours PRN  multivitamin 1 Tablet(s) Oral daily  ondansetron Injectable 4 milliGRAM(s) IV Push every 6 hours PRN  pantoprazole    Tablet 40 milliGRAM(s) Oral before breakfast  thiamine IVPB 500 milliGRAM(s) IV Intermittent every 8 hours    Drug Dosing Weight  Height (cm): 177.8 (20 Jun 2021 21:56)  Weight (kg): 57.8 (23 Jun 2021 06:45)  BMI (kg/m2): 18.3 (23 Jun 2021 06:45)  BSA (m2): 1.72 (23 Jun 2021 06:45)    CENTRAL LINE: [ ] YES [x ] NO  LOCATION:   DATE INSERTED:  REMOVE: [ ] YES [ ] NO  EXPLAIN:    REY: [ ] YES [ x] NO    DATE INSERTED:  REMOVE:  [ ] YES [ ] NO  EXPLAIN:    A-LINE:  [ ] YES [ x] NO  LOCATION:   DATE INSERTED:  REMOVE:  [ ] YES [ ] NO  EXPLAIN:    PMH -reviewed admission note, no change since admission    ICU Vital Signs Last 24 Hrs  T(C): 36.4 (24 Jun 2021 08:00), Max: 36.4 (24 Jun 2021 08:00)  T(F): 97.6 (24 Jun 2021 08:00), Max: 97.6 (24 Jun 2021 08:00)  HR: 81 (24 Jun 2021 09:00) (66 - 86)  BP: 115/91 (24 Jun 2021 09:00) (115/88 - 154/107)  BP(mean): 96 (24 Jun 2021 09:00) (94 - 119)  ABP: --  ABP(mean): --  RR: 16 (24 Jun 2021 09:00) (13 - 18)  SpO2: 100% (24 Jun 2021 09:00) (100% - 100%)            06-23 @ 07:01  -  06-24 @ 07:00  --------------------------------------------------------  IN: 2551.2 mL / OUT: 1952 mL / NET: 599.2 mL            PHYSICAL EXAM:    GENERAL: Drowsy but arousable   HEAD:  Atraumatic, Normocephalic  EYES: EOMI, PERRLA, conjunctiva and sclera clear  ENMT: No tonsillar erythema, exudates, or enlargement; Moist mucous membranes, Good dentition, No lesions  NECK: Supple, normal appearance, No JVD; Normal thyroid; Trachea midline  NERVOUS SYSTEM: Drowsy but arousable. AxO2. Follow commands, no focal neurologic deficit   CHEST/LUNG: No chest deformity; Normal percussion bilaterally; No rales, rhonchi, wheezing   HEART: Regular rate and rhythm; No murmurs, rubs, or gallops  ABDOMEN: Soft, Nontender, Nondistended; Bowel sounds present  EXTREMITIES:  2+ Peripheral Pulses, No clubbing, cyanosis, or edema  LYMPH: No lymphadenopathy noted  SKIN: No rashes or lesions; Good capillary refill      LABS:  CBC Full  -  ( 24 Jun 2021 04:20 )  WBC Count : 5.24 K/uL  RBC Count : 4.04 M/uL  Hemoglobin : 13.6 g/dL  Hematocrit : 39.0 %  Platelet Count - Automated : 135 K/uL  Mean Cell Volume : 96.5 fl  Mean Cell Hemoglobin : 33.7 pg  Mean Cell Hemoglobin Concentration : 34.9 gm/dL  Auto Neutrophil # : x  Auto Lymphocyte # : x  Auto Monocyte # : x  Auto Eosinophil # : x  Auto Basophil # : x  Auto Neutrophil % : x  Auto Lymphocyte % : x  Auto Monocyte % : x  Auto Eosinophil % : x  Auto Basophil % : x    06-24    139  |  104  |  3<L>  ----------------------------<  96  3.5   |  26  |  0.45<L>    Ca    8.7      24 Jun 2021 04:20  Phos  4.8     06-24  Mg     1.9     06-24    TPro  7.2  /  Alb  3.4<L>  /  TBili  1.4<H>  /  DBili  x   /  AST  344<H>  /  ALT  165<H>  /  AlkPhos  90  06-24        Culture Results:   No growth (06-22 @ 22:10)      RADIOLOGY & ADDITIONAL STUDIES REVIEWED:  ***    GOALS OF CARE DISCUSSION WITH PATIENT/FAMILY/PROXY:    CRITICAL CARE TIME SPENT: 35 minutes

## 2021-06-25 LAB
ALBUMIN SERPL ELPH-MCNC: 3.2 G/DL — LOW (ref 3.5–5)
ALP SERPL-CCNC: 91 U/L — SIGNIFICANT CHANGE UP (ref 40–120)
ALT FLD-CCNC: 104 U/L DA — HIGH (ref 10–60)
ANION GAP SERPL CALC-SCNC: 5 MMOL/L — SIGNIFICANT CHANGE UP (ref 5–17)
AST SERPL-CCNC: 116 U/L — HIGH (ref 10–40)
BILIRUB SERPL-MCNC: 1 MG/DL — SIGNIFICANT CHANGE UP (ref 0.2–1.2)
BUN SERPL-MCNC: 4 MG/DL — LOW (ref 7–18)
CALCIUM SERPL-MCNC: 9 MG/DL — SIGNIFICANT CHANGE UP (ref 8.4–10.5)
CHLORIDE SERPL-SCNC: 105 MMOL/L — SIGNIFICANT CHANGE UP (ref 96–108)
CO2 SERPL-SCNC: 30 MMOL/L — SIGNIFICANT CHANGE UP (ref 22–31)
CREAT SERPL-MCNC: 0.64 MG/DL — SIGNIFICANT CHANGE UP (ref 0.5–1.3)
GLUCOSE SERPL-MCNC: 96 MG/DL — SIGNIFICANT CHANGE UP (ref 70–99)
HCT VFR BLD CALC: 37.1 % — LOW (ref 39–50)
HGB BLD-MCNC: 12.5 G/DL — LOW (ref 13–17)
MAGNESIUM SERPL-MCNC: 1.6 MG/DL — SIGNIFICANT CHANGE UP (ref 1.6–2.6)
MCHC RBC-ENTMCNC: 32.9 PG — SIGNIFICANT CHANGE UP (ref 27–34)
MCHC RBC-ENTMCNC: 33.7 GM/DL — SIGNIFICANT CHANGE UP (ref 32–36)
MCV RBC AUTO: 97.6 FL — SIGNIFICANT CHANGE UP (ref 80–100)
NRBC # BLD: 0 /100 WBCS — SIGNIFICANT CHANGE UP (ref 0–0)
PHOSPHATE SERPL-MCNC: 3.8 MG/DL — SIGNIFICANT CHANGE UP (ref 2.5–4.5)
PLATELET # BLD AUTO: 139 K/UL — LOW (ref 150–400)
POTASSIUM SERPL-MCNC: 3.6 MMOL/L — SIGNIFICANT CHANGE UP (ref 3.5–5.3)
POTASSIUM SERPL-SCNC: 3.6 MMOL/L — SIGNIFICANT CHANGE UP (ref 3.5–5.3)
PROT SERPL-MCNC: 7 G/DL — SIGNIFICANT CHANGE UP (ref 6–8.3)
RBC # BLD: 3.8 M/UL — LOW (ref 4.2–5.8)
RBC # FLD: 11.4 % — SIGNIFICANT CHANGE UP (ref 10.3–14.5)
SODIUM SERPL-SCNC: 140 MMOL/L — SIGNIFICANT CHANGE UP (ref 135–145)
WBC # BLD: 4.19 K/UL — SIGNIFICANT CHANGE UP (ref 3.8–10.5)
WBC # FLD AUTO: 4.19 K/UL — SIGNIFICANT CHANGE UP (ref 3.8–10.5)

## 2021-06-25 RX ORDER — NICOTINE POLACRILEX 2 MG
4 GUM BUCCAL EVERY 12 HOURS
Refills: 0 | Status: DISCONTINUED | OUTPATIENT
Start: 2021-06-25 | End: 2021-06-28

## 2021-06-25 RX ORDER — NICOTINE POLACRILEX 2 MG
1 GUM BUCCAL DAILY
Refills: 0 | Status: DISCONTINUED | OUTPATIENT
Start: 2021-06-25 | End: 2021-06-28

## 2021-06-25 RX ADMIN — Medication 1 PATCH: at 19:41

## 2021-06-25 RX ADMIN — Medication 4 MILLIGRAM(S): at 12:52

## 2021-06-25 RX ADMIN — Medication 1 MILLIGRAM(S): at 11:55

## 2021-06-25 RX ADMIN — Medication 25 MILLIGRAM(S): at 11:56

## 2021-06-25 RX ADMIN — Medication 2 MILLIGRAM(S): at 10:00

## 2021-06-25 RX ADMIN — Medication 1 PATCH: at 12:52

## 2021-06-25 RX ADMIN — Medication 2 MILLIGRAM(S): at 17:54

## 2021-06-25 RX ADMIN — Medication 2 MILLIGRAM(S): at 19:32

## 2021-06-25 RX ADMIN — Medication 2 MILLIGRAM(S): at 14:22

## 2021-06-25 RX ADMIN — Medication 2 MILLIGRAM(S): at 11:20

## 2021-06-25 RX ADMIN — Medication 4 MILLIGRAM(S): at 05:18

## 2021-06-25 RX ADMIN — Medication 4 MILLIGRAM(S): at 02:59

## 2021-06-25 RX ADMIN — Medication 2 MILLIGRAM(S): at 17:29

## 2021-06-25 RX ADMIN — PANTOPRAZOLE SODIUM 40 MILLIGRAM(S): 20 TABLET, DELAYED RELEASE ORAL at 05:19

## 2021-06-25 RX ADMIN — Medication 1 TABLET(S): at 11:55

## 2021-06-25 RX ADMIN — CHLORHEXIDINE GLUCONATE 1 APPLICATION(S): 213 SOLUTION TOPICAL at 05:19

## 2021-06-25 RX ADMIN — Medication 25 MILLIGRAM(S): at 21:57

## 2021-06-25 RX ADMIN — ENOXAPARIN SODIUM 40 MILLIGRAM(S): 100 INJECTION SUBCUTANEOUS at 11:56

## 2021-06-25 NOTE — DIETITIAN INITIAL EVALUATION ADULT. - OTHER INFO
Pt seen, 1:1.  PCA reports he ate all his lunch, but no breakfast. Attempted to interview pt, but was AMS. Discussed with RN. Daily wt 55.3 kg (6/25) representing 4% wt loss/ <1 week. Discussed with PGY 2, Enlive PO BID added.

## 2021-06-25 NOTE — PROGRESS NOTE ADULT - ASSESSMENT
36 year old man with PMH of chronic alcohol abuse brought in after a witnessed generalized seizure episode. Pt is admitted for alcohol withdrawals seizures and subclinical alcohol induced pancreatitis . RRT was called as pt was getting agitated and try to get out of bed CIWA is 16  .Pt will be transferred to ICU for Alcohol abuse and starting on Precedex drip     Assessment:  1. Alcohol withdrawal seizure without complication.  2. Alcohol abuse   3. Elevated LFTs and lipase       Plan:    =====================[CNS ]==============================  #Alcohol withdrawal seizure without complication.  :   - Admitted for alcohol withdrawal seizures  - Ativan PRN 2q2h and Ativan 4q4h standing dose  - Bal < 3 on admission, last drink 2 days PTA   - c/w ciwa protocol  - c/w IV Thiamine, Folate, Multivitamins  - B12 & Folate levels wnl  - Precedex drip titrating down  - EEG: No epileptiform pattern or seizure seen.  - Neuro Dr. Gaxiola consulted.     =====================[CVS ]===============================  # No active issues:   - BP currently acceptable   - monitor BP   -     =====================[RESP ]==============================  # No active issues:        =====================[ GI ]================================  Lipase 1500  No abdominal pain   Doesn't meet criteria for acute pancreatitis   D5 LR 75cc/hr   PPI IV switch to PO  Starting on diet       #elevated LFTs:   - Pt noted to have elevated LFT's trends up today   - likely in the setting of etoh abuse   - T Bili 1. Direct bili 0.5  - RUQ US negative for cholelithiasis or CBD dilation   - f/u LFTs daily           ====================[ RENAL ]=============================   # No active issues:  - Monitor renal function , intake and output             =====================[ ID ]================================  UA negative   dc'ed Rocephin       ===================[ HEME/ONC ]===========================  # No active issues:    =====================[ ENDO ]=============================  # No active issues:  -dc'ed FS as pt is not diabetic and started on diet       ================= Skin/Catheters============================  # No active issues   - No rashes.  - Gonzales     ==================[ PROPHYLAXIS ]==========================   # Dvt : Lovenox daily  # Gi : PPI    ====================[ DISPO ]==============================   - Monitor in ICU     36 year old man with PMH of chronic alcohol abuse brought in after a witnessed generalized seizure episode. Pt is admitted for alcohol withdrawals seizures and subclinical alcohol induced pancreatitis . RRT was called as pt was getting agitated and try to get out of bed CIWA is 16  .Pt will be transferred to ICU for Alcohol abuse and starting on Precedex drip     Assessment:  1. Alcohol withdrawal seizure without complication.  2. Alcohol abuse   3. Elevated LFTs and lipase       Plan:    =====================[CNS ]==============================  #Alcohol withdrawal seizure without complication.  :   - Admitted for alcohol withdrawal seizures  - Ativan PRN 2q2h and Ativan 2q4h standing dose  - Bal < 3 on admission, last drink 2 days PTA   - c/w ciwa protocol  - c/w IV Thiamine, Folate, Multivitamins  - B12 & Folate levels wnl  - Precedex drip off  - EEG: No epileptiform pattern or seizure seen.  - Neuro Dr. Gaxiola consulted.     =====================[CVS ]===============================  # No active issues:   - BP currently acceptable   - monitor BP   -     =====================[RESP ]==============================  # No active issues:        =====================[ GI ]================================  Lipase 1500  No abdominal pain   Doesn't meet criteria for acute pancreatitis   dc'ed fluids  On diet  PPI PO      #Elevated LFTs:   - Pt noted to have elevated LFT's trends down today  - likely in the setting of etoh abuse   - T Bili 1. Direct bili 0.5  - RUQ US negative for cholelithiasis or CBD dilation   - f/u LFTs daily           ====================[ RENAL ]=============================   # No active issues:  - Monitor renal function , intake and output             =====================[ ID ]================================  UA negative   dc'ed Rocephin       ===================[ HEME/ONC ]===========================  # No active issues:    =====================[ ENDO ]=============================  # No active issues:  HgbA1C 5    ================= Skin/Catheters============================  # No active issues   - No rashes.  - Gonzales     ==================[ PROPHYLAXIS ]==========================   # Dvt : Lovenox daily  # Gi : PPI    ====================[ DISPO ]==============================   - Monitor in ICU     36 year old man with PMH of chronic alcohol abuse brought in after a witnessed generalized seizure episode. Pt is admitted for alcohol withdrawals seizures and subclinical alcohol induced pancreatitis . RRT was called as pt was getting agitated and try to get out of bed CIWA is 16  .Pt will be transferred to ICU for Alcohol abuse and starting on Precedex drip     Assessment:  1. Alcohol withdrawal seizure without complication.  2. Alcohol abuse   3. Elevated LFTs and lipase       Plan:    =====================[CNS ]==============================  #Alcohol withdrawal seizure without complication.  :   - Admitted for alcohol withdrawal seizures  - Ativan PRN 2q2h and Ativan 2q4h standing dose  - Bal < 3 on admission, last drink 2 days PTA   - c/w ciwa protocol  - c/w IV Thiamine, Folate, Multivitamins  - B12 & Folate levels wnl  - Precedex drip off  - EEG: No epileptiform pattern or seizure seen.  - Neuro Dr. Gaxiola consulted.     =====================[CVS ]===============================  # No active issues:   - BP currently acceptable   - monitor BP   -     =====================[RESP ]==============================  # No active issues:        =====================[ GI ]================================  Lipase 1500  No abdominal pain   Doesn't meet criteria for acute pancreatitis   dc'ed fluids  On diet  PPI PO      #Elevated LFTs:   - Pt noted to have elevated LFT's trends down today  - likely in the setting of etoh abuse   - T Bili 1. Direct bili 0.5  - RUQ US negative for cholelithiasis or CBD dilation   - f/u LFTs daily           ====================[ RENAL ]=============================   # No active issues:  - Monitor renal function , intake and output             =====================[ ID ]================================  UA negative   dc'ed Rocephin       ===================[ HEME/ONC ]===========================  # No active issues:    =====================[ ENDO ]=============================  # No active issues:  HgbA1C 5    ================= Skin/Catheters============================  # No active issues   - No rashes.  - Gonzales     ==================[ PROPHYLAXIS ]==========================   # Dvt : Lovenox daily  # Gi : PPI    ====================[ DISPO ]==============================   - DG to medicine

## 2021-06-25 NOTE — CHART NOTE - NSCHARTNOTEFT_GEN_A_CORE
36 year old man with PMH of chronic alcohol abuse brought in after a witnessed generalized seizure episode. Pt is admitted for alcohol withdrawals seizures. RRT was called as pt was getting agitated on floor and trying to get out of bed. CIWA is 16. Pt will be transferred to ICU for Alcohol abuse and started on Precedex drip.     ICU Course:   Pt was started on precedex drip. Titrate it off as tolerated. Ativan 2mg q2h PRN with Ativan 4mg q4h standing. Pt was NPO initially. Advanced the diet as tolerated. Ativan standing decreases to 2mg q4hr. Librium was added. 25mg q8hr. Elevated liver enzymes trending down likely sec to alcohol abuse. Lipase elevated but pt is without abdominal pain. US abdomen shows no cholelithiasis or CBD dilation.  consulted. Pt is stable to downgrade from floor. Discussed with ICU attending.     Things to follow :  Monitor CIWA  Taper off librium  F/U SW

## 2021-06-25 NOTE — DIETITIAN NUTRITION RISK NOTIFICATION - TREATMENT: THE FOLLOWING DIET HAS BEEN RECOMMENDED
Diet, Regular:   Supplement Feeding Modality:  Oral  Ensure Enlive Cans or Servings Per Day:  1       Frequency:  Two Times a day (06-25-21 @ 14:56) [Active]

## 2021-06-25 NOTE — DIETITIAN INITIAL EVALUATION ADULT. - PERTINENT MEDS FT
MEDICATIONS  (STANDING):  chlordiazePOXIDE 25 milliGRAM(s) Oral every 8 hours  chlorhexidine 2% Cloths 1 Application(s) Topical <User Schedule>  dextrose 50% Injectable 25 Gram(s) IV Push once  enoxaparin Injectable 40 milliGRAM(s) SubCutaneous daily  folic acid 1 milliGRAM(s) Oral daily  multivitamin 1 Tablet(s) Oral daily  nicotine  Polacrilex Gum 4 milliGRAM(s) Oral every 12 hours  nicotine - 21 mG/24Hr(s) Patch 1 patch Transdermal daily  pantoprazole    Tablet 40 milliGRAM(s) Oral before breakfast    MEDICATIONS  (PRN):  acetaminophen   Tablet .. 650 milliGRAM(s) Oral every 8 hours PRN Temp greater or equal to 38C (100.4F)  LORazepam   Injectable 2 milliGRAM(s) IV Push every 2 hours PRN CIWA-Ar score 7 or greater  ondansetron Injectable 4 milliGRAM(s) IV Push every 6 hours PRN Nausea and/or Vomiting

## 2021-06-25 NOTE — DIETITIAN INITIAL EVALUATION ADULT. - PERTINENT LABORATORY DATA
06-25 Na140 mmol/L Glu 96 mg/dL K+ 3.6 mmol/L Cr  0.64 mg/dL BUN 4 mg/dL<L>   06-25 Phos 3.8 mg/dL   06-25 Alb 3.2 g/dL<L>     06-22 Chol 247 mg/dL<H> LDL --    HDL 92 mg/dL Trig 128 mg/dL    06-22-21 @ 10:12 HgbA1C 5.0

## 2021-06-25 NOTE — PROGRESS NOTE ADULT - SUBJECTIVE AND OBJECTIVE BOX
INTERVAL HPI/OVERNIGHT EVENTS: ***    PRESSORS: [ ] YES [ ] NO  WHICH:    ANTIBIOTICS:                      Antimicrobial:    Cardiovascular:    Pulmonary:    Hematalogic:  enoxaparin Injectable 40 milliGRAM(s) SubCutaneous daily    Other:  acetaminophen   Tablet .. 650 milliGRAM(s) Oral every 8 hours PRN  chlorhexidine 2% Cloths 1 Application(s) Topical <User Schedule>  dexMEDEtomidine Infusion 0.2 MICROgram(s)/kG/Hr IV Continuous <Continuous>  dextrose 5% + lactated ringers. 1000 milliLiter(s) IV Continuous <Continuous>  dextrose 50% Injectable 25 Gram(s) IV Push once  folic acid 1 milliGRAM(s) Oral daily  LORazepam   Injectable 4 milliGRAM(s) IV Push every 4 hours  LORazepam   Injectable 2 milliGRAM(s) IV Push every 2 hours PRN  multivitamin 1 Tablet(s) Oral daily  ondansetron Injectable 4 milliGRAM(s) IV Push every 6 hours PRN  pantoprazole    Tablet 40 milliGRAM(s) Oral before breakfast    acetaminophen   Tablet .. 650 milliGRAM(s) Oral every 8 hours PRN  chlorhexidine 2% Cloths 1 Application(s) Topical <User Schedule>  dexMEDEtomidine Infusion 0.2 MICROgram(s)/kG/Hr IV Continuous <Continuous>  dextrose 5% + lactated ringers. 1000 milliLiter(s) IV Continuous <Continuous>  dextrose 50% Injectable 25 Gram(s) IV Push once  enoxaparin Injectable 40 milliGRAM(s) SubCutaneous daily  folic acid 1 milliGRAM(s) Oral daily  LORazepam   Injectable 4 milliGRAM(s) IV Push every 4 hours  LORazepam   Injectable 2 milliGRAM(s) IV Push every 2 hours PRN  multivitamin 1 Tablet(s) Oral daily  ondansetron Injectable 4 milliGRAM(s) IV Push every 6 hours PRN  pantoprazole    Tablet 40 milliGRAM(s) Oral before breakfast    Drug Dosing Weight  Height (cm): 177.8 (20 Jun 2021 21:56)  Weight (kg): 57.8 (23 Jun 2021 06:45)  BMI (kg/m2): 18.3 (23 Jun 2021 06:45)  BSA (m2): 1.72 (23 Jun 2021 06:45)    CENTRAL LINE: [ ] YES [ ] NO  LOCATION:   DATE INSERTED:  REMOVE: [ ] YES [ ] NO  EXPLAIN:    REY: [ ] YES [ ] NO    DATE INSERTED:  REMOVE:  [ ] YES [ ] NO  EXPLAIN:    A-LINE:  [ ] YES [ ] NO  LOCATION:   DATE INSERTED:  REMOVE:  [ ] YES [ ] NO  EXPLAIN:    PMH -reviewed admission note, no change since admission    ICU Vital Signs Last 24 Hrs  T(C): 37 (25 Jun 2021 07:00), Max: 37.4 (25 Jun 2021 05:00)  T(F): 98.6 (25 Jun 2021 07:00), Max: 99.3 (25 Jun 2021 05:00)  HR: 62 (25 Jun 2021 07:00) (57 - 102)  BP: 145/101 (25 Jun 2021 07:00) (90/62 - 151/103)  BP(mean): 111 (25 Jun 2021 07:00) (70 - 114)  ABP: --  ABP(mean): --  RR: 16 (25 Jun 2021 07:00) (13 - 23)  SpO2: 100% (25 Jun 2021 07:00) (96% - 100%)            06-24 @ 07:01  -  06-25 @ 07:00  --------------------------------------------------------  IN: 1449.4 mL / OUT: 1330 mL / NET: 119.4 mL            PHYSICAL EXAM:    GENERAL: NAD, well-groomed, well-developed  HEAD:  Atraumatic, Normocephalic  EYES: EOMI, PERRLA, conjunctiva and sclera clear  ENMT: No tonsillar erythema, exudates, or enlargement; Moist mucous membranes, Good dentition, No lesions  NECK: Supple, normal appearance, No JVD; Normal thyroid; Trachea midline  NERVOUS SYSTEM:  Alert & Oriented X3, Good concentration; Motor Strength 5/5 B/L upper and lower extremities; DTRs 2+ intact and symmetric  CHEST/LUNG: No chest deformity; Normal percussion bilaterally; No rales, rhonchi, wheezing   HEART: Regular rate and rhythm; No murmurs, rubs, or gallops  ABDOMEN: Soft, Nontender, Nondistended; Bowel sounds present  EXTREMITIES:  2+ Peripheral Pulses, No clubbing, cyanosis, or edema  LYMPH: No lymphadenopathy noted  SKIN: No rashes or lesions; Good capillary refill      LABS:  CBC Full  -  ( 25 Jun 2021 04:51 )  WBC Count : 4.19 K/uL  RBC Count : 3.80 M/uL  Hemoglobin : 12.5 g/dL  Hematocrit : 37.1 %  Platelet Count - Automated : 139 K/uL  Mean Cell Volume : 97.6 fl  Mean Cell Hemoglobin : 32.9 pg  Mean Cell Hemoglobin Concentration : 33.7 gm/dL  Auto Neutrophil # : x  Auto Lymphocyte # : x  Auto Monocyte # : x  Auto Eosinophil # : x  Auto Basophil # : x  Auto Neutrophil % : x  Auto Lymphocyte % : x  Auto Monocyte % : x  Auto Eosinophil % : x  Auto Basophil % : x    06-25    140  |  105  |  4<L>  ----------------------------<  96  3.6   |  30  |  0.64    Ca    9.0      25 Jun 2021 04:51  Phos  3.8     06-25  Mg     1.6     06-25    TPro  7.0  /  Alb  3.2<L>  /  TBili  1.0  /  DBili  x   /  AST  116<H>  /  ALT  104<H>  /  AlkPhos  91  06-25        Culture Results:   No growth (06-22 @ 22:10)      RADIOLOGY & ADDITIONAL STUDIES REVIEWED:  ***    GOALS OF CARE DISCUSSION WITH PATIENT/FAMILY/PROXY:    CRITICAL CARE TIME SPENT: 35 minutes INTERVAL HPI/ OVERNIGHT EVENTS: Pt off Precedex since yesterday. No PRN ativan overnight. Standing ativan decreases from 4q4 to 2q4. Pt is for DG to medicine.     PRESSORS: [ ] YES [x ] NO  WHICH:    ANTIBIOTICS:                      Antimicrobial:    Cardiovascular:    Pulmonary:    Hematalogic:  enoxaparin Injectable 40 milliGRAM(s) SubCutaneous daily    Other:  acetaminophen   Tablet .. 650 milliGRAM(s) Oral every 8 hours PRN  chlorhexidine 2% Cloths 1 Application(s) Topical <User Schedule>  dexMEDEtomidine Infusion 0.2 MICROgram(s)/kG/Hr IV Continuous <Continuous>  dextrose 5% + lactated ringers. 1000 milliLiter(s) IV Continuous <Continuous>  dextrose 50% Injectable 25 Gram(s) IV Push once  folic acid 1 milliGRAM(s) Oral daily  LORazepam   Injectable 4 milliGRAM(s) IV Push every 4 hours  LORazepam   Injectable 2 milliGRAM(s) IV Push every 2 hours PRN  multivitamin 1 Tablet(s) Oral daily  ondansetron Injectable 4 milliGRAM(s) IV Push every 6 hours PRN  pantoprazole    Tablet 40 milliGRAM(s) Oral before breakfast    acetaminophen   Tablet .. 650 milliGRAM(s) Oral every 8 hours PRN  chlorhexidine 2% Cloths 1 Application(s) Topical <User Schedule>  dexMEDEtomidine Infusion 0.2 MICROgram(s)/kG/Hr IV Continuous <Continuous>  dextrose 5% + lactated ringers. 1000 milliLiter(s) IV Continuous <Continuous>  dextrose 50% Injectable 25 Gram(s) IV Push once  enoxaparin Injectable 40 milliGRAM(s) SubCutaneous daily  folic acid 1 milliGRAM(s) Oral daily  LORazepam   Injectable 4 milliGRAM(s) IV Push every 4 hours  LORazepam   Injectable 2 milliGRAM(s) IV Push every 2 hours PRN  multivitamin 1 Tablet(s) Oral daily  ondansetron Injectable 4 milliGRAM(s) IV Push every 6 hours PRN  pantoprazole    Tablet 40 milliGRAM(s) Oral before breakfast    Drug Dosing Weight  Height (cm): 177.8 (20 Jun 2021 21:56)  Weight (kg): 57.8 (23 Jun 2021 06:45)  BMI (kg/m2): 18.3 (23 Jun 2021 06:45)  BSA (m2): 1.72 (23 Jun 2021 06:45)    CENTRAL LINE: [ ] YES [x ] NO  LOCATION:   DATE INSERTED:  REMOVE: [ ] YES [ ] NO  EXPLAIN:    REY: [ ] YES [x ] NO    DATE INSERTED:  REMOVE:  [ ] YES [ ] NO  EXPLAIN:    A-LINE:  [ ] YES [x ] NO  LOCATION:   DATE INSERTED:  REMOVE:  [ ] YES [ ] NO  EXPLAIN:    PMH -reviewed admission note, no change since admission    ICU Vital Signs Last 24 Hrs  T(C): 37 (25 Jun 2021 07:00), Max: 37.4 (25 Jun 2021 05:00)  T(F): 98.6 (25 Jun 2021 07:00), Max: 99.3 (25 Jun 2021 05:00)  HR: 62 (25 Jun 2021 07:00) (57 - 102)  BP: 145/101 (25 Jun 2021 07:00) (90/62 - 151/103)  BP(mean): 111 (25 Jun 2021 07:00) (70 - 114)  ABP: --  ABP(mean): --  RR: 16 (25 Jun 2021 07:00) (13 - 23)  SpO2: 100% (25 Jun 2021 07:00) (96% - 100%)            06-24 @ 07:01  -  06-25 @ 07:00  --------------------------------------------------------  IN: 1449.4 mL / OUT: 1330 mL / NET: 119.4 mL      PHYSICAL EXAM:    GENERAL: NAD, appears comfortable   HEAD:  Atraumatic, Normocephalic  EYES: EOMI, PERRLA, conjunctiva and sclera clear  ENMT: No tonsillar erythema, exudates, or enlargement; Moist mucous membranes, Good dentition, No lesions  NECK: Supple, normal appearance, No JVD; Normal thyroid; Trachea midline  NERVOUS SYSTEM:  Alert & Oriented X 2-3, No focal neurologic deficit   CHEST/LUNG: No chest deformity; Normal percussion bilaterally; No rales, rhonchi, wheezing   HEART: Regular rate and rhythm; No murmurs, rubs, or gallops  ABDOMEN: Soft, Nontender, Nondistended; Bowel sounds present  EXTREMITIES:  2+ Peripheral Pulses, No clubbing, cyanosis, or edema  SKIN: No rashes or lesions; Good capillary refill      LABS:  CBC Full  -  ( 25 Jun 2021 04:51 )  WBC Count : 4.19 K/uL  RBC Count : 3.80 M/uL  Hemoglobin : 12.5 g/dL  Hematocrit : 37.1 %  Platelet Count - Automated : 139 K/uL  Mean Cell Volume : 97.6 fl  Mean Cell Hemoglobin : 32.9 pg  Mean Cell Hemoglobin Concentration : 33.7 gm/dL  Auto Neutrophil # : x  Auto Lymphocyte # : x  Auto Monocyte # : x  Auto Eosinophil # : x  Auto Basophil # : x  Auto Neutrophil % : x  Auto Lymphocyte % : x  Auto Monocyte % : x  Auto Eosinophil % : x  Auto Basophil % : x    06-25    140  |  105  |  4<L>  ----------------------------<  96  3.6   |  30  |  0.64    Ca    9.0      25 Jun 2021 04:51  Phos  3.8     06-25  Mg     1.6     06-25    TPro  7.0  /  Alb  3.2<L>  /  TBili  1.0  /  DBili  x   /  AST  116<H>  /  ALT  104<H>  /  AlkPhos  91  06-25        Culture Results:   No growth (06-22 @ 22:10)      RADIOLOGY & ADDITIONAL STUDIES REVIEWED:  ***    GOALS OF CARE DISCUSSION WITH PATIENT/FAMILY/PROXY:    CRITICAL CARE TIME SPENT: 35 minutes

## 2021-06-25 NOTE — DIETITIAN INITIAL EVALUATION ADULT. - CONTINUE CURRENT NUTRITION CARE PLAN
Occupational Therapy  Facility/Department: [facility] PROGRESSIVE CARE  Daily Treatment Note  NAME: Yesica Polk  : 1945  MRN: [id number]    Date of Service: 2020    Discharge Recommendations:  Subacute/Skilled Nursing Facility       Assessment   Performance deficits / Impairments: Decreased functional mobility ; Decreased ADL status; Decreased strength;Decreased safe awareness;Decreased balance;Decreased sensation;Decreased endurance;Decreased posture  Prognosis: Fair  OT Education: OT Role;Plan of Care;Transfer Training;Energy Conservation;Precautions  REQUIRES OT FOLLOW UP: Yes  Activity Tolerance  Activity Tolerance: Patient limited by fatigue;Patient limited by pain  Safety Devices  Safety Devices in place: Yes  Type of devices: Call light within reach;Nurse notified; Patient at risk for falls; Left in bed;Bed alarm in place         Patient Diagnosis(es): The primary encounter diagnosis was Dizziness. Diagnoses of Atrial fibrillation with RVR ([facility] and Acute on chronic congestive heart failure, unspecified heart failure type [facility]) were also pertinent to this visit. has a past medical history of History of alcoholism ([facility].) and Hypertension. has a past surgical history that includes hernia repair and Hydrocele surgery.     Restrictions  Restrictions/Precautions  Restrictions/Precautions: Fall Risk, Up as Tolerated, Contact Precautions, General Precautions  Required Braces or Orthoses?: No  Position Activity Restriction  Other position/activity restrictions: L AKA, wounds on RLE, telemetry, LUE IV, Heels off bed if unable to move LE's, turn/assist Q 2 hrs if unable to turn self, garcia, fecal mgt system  Subjective   General  Chart Reviewed: Yes  Patient assessed for rehabilitation services?: Yes  Response to previous treatment: Patient with no complaints from previous session  Family / Caregiver Present: No      Orientation  Orientation  Overall Orientation Status: Within Functional Limits  Objective Patient/Caregiver Education & Training  AM-PAC Score        AM-[facility] Inpatient Daily Activity Raw Score: 11 (09/29/20 1446)  AM-PAC Inpatient ADL T-Scale Score : 29.04 (09/29/20 1446)  ADL Inpatient CMS 0-100% Score: 70.42 (09/29/20 1446)  ADL Inpatient CMS G-Code Modifier : CL (09/29/20 1446)    Goals  Short term goals  Time Frame for Short term goals: by discharge, pt will  Short term goal 1: demo mod A with bed mob with rails/controls  Short term goal 2: tolerate sitting EOB x 15 min for inc ADL participation and prep for transfers  Short term goal 3: Participate in B UE HEP for inc. functional strength for ADLs/mob  Short term goal 4: complete simple grooming with SBA following set up and min A with UB ADLs  Patient Goals   Patient goals : not stated       Therapy Time   Individual Concurrent Group Co-treatment   Time In       1342   Time Out       1402   Minutes       20     Co-treatment with PT warranted secondary to decreased safety and independence requiring 2 skilled therapy professionals to address individual discipline's goals. OT addressing preparation for ADL transfer, sitting balance for increased ADL performance, sitting/activity tolerance, functional reaching, environmental safety/scanning, fall prevention, functional mobility for ADL transfers and functional UE strength. Upon writer exit, call light within reach, pt retired to bed. All lines intact and patient positioned comfortably. All patient needs addressed prior to ending therapy session. Chart reviewed prior to treatment and patient is agreeable for therapy. RN reports patient is medically stable for therapy treatment this date.       FARZANEH Dang yes

## 2021-06-26 ENCOUNTER — TRANSCRIPTION ENCOUNTER (OUTPATIENT)
Age: 36
End: 2021-06-26

## 2021-06-26 LAB
CULTURE RESULTS: SIGNIFICANT CHANGE UP
CULTURE RESULTS: SIGNIFICANT CHANGE UP
SPECIMEN SOURCE: SIGNIFICANT CHANGE UP
SPECIMEN SOURCE: SIGNIFICANT CHANGE UP

## 2021-06-26 PROCEDURE — 99233 SBSQ HOSP IP/OBS HIGH 50: CPT | Mod: GC

## 2021-06-26 RX ORDER — MAGNESIUM SULFATE 500 MG/ML
2 VIAL (ML) INJECTION ONCE
Refills: 0 | Status: COMPLETED | OUTPATIENT
Start: 2021-06-26 | End: 2021-06-26

## 2021-06-26 RX ORDER — FOLIC ACID 0.8 MG
1 TABLET ORAL
Qty: 30 | Refills: 0
Start: 2021-06-26 | End: 2021-07-25

## 2021-06-26 RX ORDER — NICOTINE POLACRILEX 2 MG
1 GUM BUCCAL
Qty: 30 | Refills: 0
Start: 2021-06-26 | End: 2021-07-25

## 2021-06-26 RX ORDER — FOLIC ACID 0.8 MG
1 TABLET ORAL
Qty: 0 | Refills: 0 | DISCHARGE
Start: 2021-06-26

## 2021-06-26 RX ORDER — NICOTINE POLACRILEX 2 MG
0 GUM BUCCAL
Qty: 0 | Refills: 0 | DISCHARGE
Start: 2021-06-26

## 2021-06-26 RX ORDER — POTASSIUM CHLORIDE 20 MEQ
40 PACKET (EA) ORAL ONCE
Refills: 0 | Status: COMPLETED | OUTPATIENT
Start: 2021-06-26 | End: 2021-06-26

## 2021-06-26 RX ADMIN — Medication 2 MILLIGRAM(S): at 15:30

## 2021-06-26 RX ADMIN — Medication 40 MILLIEQUIVALENT(S): at 08:00

## 2021-06-26 RX ADMIN — Medication 1 TABLET(S): at 12:13

## 2021-06-26 RX ADMIN — ENOXAPARIN SODIUM 40 MILLIGRAM(S): 100 INJECTION SUBCUTANEOUS at 12:13

## 2021-06-26 RX ADMIN — Medication 25 MILLIGRAM(S): at 05:34

## 2021-06-26 RX ADMIN — PANTOPRAZOLE SODIUM 40 MILLIGRAM(S): 20 TABLET, DELAYED RELEASE ORAL at 05:35

## 2021-06-26 RX ADMIN — CHLORHEXIDINE GLUCONATE 1 APPLICATION(S): 213 SOLUTION TOPICAL at 05:35

## 2021-06-26 RX ADMIN — Medication 1 MILLIGRAM(S): at 12:13

## 2021-06-26 RX ADMIN — Medication 50 GRAM(S): at 09:20

## 2021-06-26 RX ADMIN — Medication 50 MILLIGRAM(S): at 21:31

## 2021-06-26 RX ADMIN — Medication 1 PATCH: at 13:46

## 2021-06-26 RX ADMIN — Medication 1 PATCH: at 19:31

## 2021-06-26 RX ADMIN — Medication 25 MILLIGRAM(S): at 13:29

## 2021-06-26 RX ADMIN — Medication 1 PATCH: at 12:13

## 2021-06-26 NOTE — PROGRESS NOTE ADULT - ASSESSMENT
36 year old man with PMH of chronic alcohol abuse brought in after a witnessed generalized seizure episode. Pt is admitted for alcohol withdrawals seizures and subclinical alcohol induced pancreatitis . RRT was called as pt was getting agitated and try to get out of bed CIWA is 16  .Pt will be transferred to ICU for Alcohol abuse and starting on Precedex drip

## 2021-06-26 NOTE — DISCHARGE NOTE PROVIDER - DETAILS OF MALNUTRITION DIAGNOSIS/DIAGNOSES
This patient has been assessed with a concern for Malnutrition and was treated during this hospitalization for the following Nutrition diagnosis/diagnoses:     -  06/25/2021: Severe protein-calorie malnutrition   -  06/25/2021: Underweight (BMI < 19)

## 2021-06-26 NOTE — PROGRESS NOTE ADULT - PROBLEM SELECTOR PLAN 2
- elevated LFTs AST/ALT >2:1 - alcohol pattern  - T Bili 1. Direct bili 0.5  - RUQ US negative for cholelithiasis or CBD dilation   - Trended down

## 2021-06-26 NOTE — CHART NOTE - NSCHARTNOTESELECT_GEN_ALL_CORE
CT lumbar spine noncontrast:

10/6/2020



HISTORY:

Low back pain. Lumbar stenosis.



COMPARISON:

None



FINDINGS:

5 lumbar-type vertebrae.

T12-L1: Mild disc space narrowing. Diffuse disc bulge. No significant central or neural foraminal kaycee
nosis.



L1: Chronic anterior wedging of L1, with maximum of 50% anterior loss of height. No bony retropulsion
.



L1-2: Severe narrowing of the posterior third of the disc space, with vacuum disc phenomenon and scle
rosis. Broad-based disc-osteophyte complex indents the ventral aspect of the thecal sac,, and

causes mild to moderate central spinal canal stenosis, especially on the right side.

High-grade right neural foraminal stenosis. Low-grade left neural foraminal stenosis. Chronic grade 1
 right lateral subluxation of L1 on L2.



L2-3: This is the level for the apex of the levoscoliosis. Severe right-sided degenerative disc gardner
es, with severe right disc space narrowing, right vacuum disc phenomenon, large right lateral and

far lateral endplate marginal osteophytes, endplate sclerosis, with numerous endplate small defects. 
Retrolisthesis of L2 on L3. Broad-based disc-osteophyte complex indents the ventral aspect of

thecal sac, causing moderate central spinal canal stenosis. High-grade right neural foraminal stenosi
s. Low-grade left neural foraminal stenosis.



L3-4: Chronic left lateral grade 1 subluxation of L3 on L4. Severe degenerative changes involving the
 posterior aspect of the disc space. Retrolisthesis of L3 on L4. No bony high-grade central spinal

canal stenosis because of a combination by midline laminectomy defect. High-grade right neural forami
nal stenosis. Also high-grade left neural foraminal stenosis, apparently severe. Severe right and

probably severe left facet DJD.



L4: Midline laminectomy defect.



L4-5: High-grade posterior disc space narrowing. Retrolisthesis of L4 on L5. Severe right-sided degen
erative disc disease. High-grade right neural foraminal stenosis, probably severe. High-grade left

neural foraminal stenosis. Midline laminectomy defect. No central spinal canal stenosis. Severe left 
and moderate right facet DJD.



L5: Broad indentation of superior endplate of L5. Laminectomy defect.



L5-S1: Disc space maintained. Diffuse disc bulge. Mild to moderate bilateral neural foraminal stenosi
s. No central spinal canal stenosis. Mild to moderate bilateral facet DJD.



Diffuse mixed sclerotic and lucent changes throughout the vertebral bodies and posterior elements of 
all visualized portions of the sacrum, S1 through mid S4.



IMPRESSION:

1.) Diffuse mixed sclerotic and lucent changes throughout all visualized portions of the sacrum. Poss
ibilities include nonacute, nondisplaced sacral insufficiency fractures versus osseous metastatic

disease.

2) severe lumbar spondylosis with multilevel high-grade degenerative disc disease and high-grade face
t osteoarthrosis.

3) status post midline laminectomies from L3-4 through L4-5.

4) multilevel high-grade neural foraminal stenosis, including severe.

5) scoliosis



Reported By: Ryan Cowart 

Electronically Signed:  10/6/2020 2:00 PM
Event Note
Family/Event Note
Transfer Note

## 2021-06-26 NOTE — PROGRESS NOTE ADULT - PROBLEM SELECTOR PLAN 1
- Admitted for alcohol withdrawal seizures  - Ativan PRN 2q2 + Librium 25 q8  - Bal < 3 on admission, last drink 2 days PTA   - c/w ciwa protocol  - c/w IV Thiamine, Folate, Multivitamins  - B12 & Folate levels wnl  - Precedex drip off  - EEG: No epileptiform pattern or seizure seen  - Pt keeps insisting that he will go home to help his friend move. Explained to patient risks of leaving AMA and he demonstrated understanding. Spoke with patient brother Taran Wilson that pt may leave AMA, but will try to convince otherwise.  - Neuro Dr. Dailey

## 2021-06-26 NOTE — CHART NOTE - NSCHARTNOTEFT_GEN_A_CORE
Noe Chang called as pt insists on leaving. Pt family will not take him home and refuse his discharge. Although patient is able to ambulate, he is unsteady. Pt demonstrates understanding of his medical condition but it is unsafe to discharge him currently as his family members will not accept him at home. Case management and Social work informed.    Pt librium increased to 50mg q8 as pt with elevated CIWA for agitation and anxiety. PRN ativan given.

## 2021-06-26 NOTE — PROGRESS NOTE ADULT - ATTENDING COMMENTS
Assessment:  1. Alcohol withdrawal syndrome  2. Transaminitis   3. Alcohol withdrawal seizure  4. Alcoholic pancreatitis   5. Alcohol dependence    Plan  - Monitor for signs of withdrawal  - cont. precedex and ativan   - If has break through symptoms will initiate phenobarbital   - Thiamine and folate   - IV hydration   - Aspiration and seizure precautions  - Monitor electrolytes  - 1:1 observation  - NPO for now, until mentation improves and able to eat  - DVT prophylaxis
Assessment:  1. Alcohol withdrawal syndrome  2. Transaminitis   3. Alcohol withdrawal seizure  4. Alcoholic pancreatitis   5. Alcohol dependence    Plan  - Monitor for signs of withdrawal  - Off precedex  - Cont. Ativan prn, add librium  - Thiamine and folate   - Aspiration and seizure precautions  - LFTs improving  - Monitor electrolytes  - Oral diet  - Nicotene patch  - DVT prophylaxis  - Wants to go home, explained at this time its not safe for patient to go home as he needs to help his neighbor move. I explained that the patient had a seizure prior presentation and its not safe to leave prior to completing treatment.   - Transfer out of ICU today
Assessment:  1. Alcohol withdrawal syndrome  2. Transaminitis   3. Alcohol withdrawal seizure  4. Alcoholic pancreatitis   5. Alcohol dependence    Plan  - Monitor for signs of withdrawal  - Taper precedex  - Cont. Ativan  - If has break through symptoms will initiate phenobarbital   - Thiamine and folate   - IV hydration   - Aspiration and seizure precautions  - Monitor electrolytes  - 1:1 observation  - Oral diet  - DVT prophylaxis
Patient seen during rounds.    36 year old M with chronic alcohol abuse, admitted for recent seizure and alcohol withdrawal s/p ICU transfer for precedex gtt was recently downgraded to medicine. Patient is on librium taper.     Patient wanting to leave AMA. Patient unsteady on his feet. Patient is AAOx3. Discussed risks of leaving including seizure, cardiac death. Discussed benefits of staying including transfer to inpatient rehab but currently refusing. Patient able to verbalize back risks and benefits. Discussed with family who want patient to stay but patient not ameable. Pt says he will arrange his own transportation
36 year old M with hx of alcohol abuse admitted after generalized seizure that was witnessed.    Patient says that he feels ok, no seizure like episode since admission. Currently NPO, but ok with advancing diet. Denies any abdominal pain    GENERAL: NAD, well-developed  HEAD:  Atraumatic, Normocephalic  CHEST/LUNG: Clear to auscultation bilaterally; No wheeze  HEART: Regular rate and rhythm; No murmurs, rubs, or gallops  ABDOMEN: Soft, Nontender, Nondistended; Bowel sounds present  EXTREMITIES:  2+ Peripheral Pulses, No clubbing, cyanosis, or edema  PSYCH: AAOx3  NEUROLOGY: non-focal                          12.4   3.69  )-----------( 99       ( 22 Jun 2021 05:55 )             36.8   06-22    139  |  104  |  2<L>  ----------------------------<  104<H>  4.2   |  26  |  0.55    Ca    9.3      22 Jun 2021 05:55  Phos  2.7     06-22  Mg     1.9     06-22    TPro  7.3  /  Alb  3.4<L>  /  TBili  1.3<H>  /  DBili  0.5<H>  /  AST  127<H>  /  ALT  68<H>  /  AlkPhos  81  06-22         A/P  #Alcohol withdrawal seizure: on CIWA, currently CIWA 6. Continue thiamine  #Alcoholic subclinical pancreatitis. No abdominal pain, will advance diet  #Dehydration with lactic acidosis. Resolved. On IVF  # Chronic alcoholic liver disease with liver transaminases. RUQ US pending  # Hyperbilirubinemia with direct hyperbilirubinemia, likely related to alcohol  # UTI, urine cx not collected. Continue ceftriaxone

## 2021-06-26 NOTE — DISCHARGE NOTE PROVIDER - NSDCMRMEDTOKEN_GEN_ALL_CORE_FT
folic acid 1 mg oral tablet: 1 tab(s) orally once a day  Multiple Vitamins oral tablet: 1 tab(s) orally once a day  nicotine 21 mg/24 hr transdermal film, extended release:  transdermal    chlordiazePOXIDE 25 mg oral capsule: 2 cap(s) orally once a day MDD:50  folic acid 1 mg oral tablet: 1 tab(s) orally once a day  Multiple Vitamins oral tablet: 1 tab(s) orally once a day  nicotine 21 mg/24 hr transdermal film, extended release: 1 film(s) transdermal once a day   thiamine 250 mg oral tablet: 1 tab(s) orally once a day    folic acid 1 mg oral tablet: 1 tab(s) orally once a day  Multiple Vitamins oral tablet: 1 tab(s) orally once a day  nicotine 21 mg/24 hr transdermal film, extended release: 1 film(s) transdermal once a day   thiamine 250 mg oral tablet: 1 tab(s) orally once a day

## 2021-06-26 NOTE — DISCHARGE NOTE PROVIDER - NSDCCPCAREPLAN_GEN_ALL_CORE_FT
PRINCIPAL DISCHARGE DIAGNOSIS  Diagnosis: Alcohol withdrawal seizure without complication  Assessment and Plan of Treatment: You were admitted due to a zeizure episode from alcohol withrawal. Please QUIT drinking alcohol. You are recommended to continue abstinence from alcohol following your discharge as it will prevent further complications caused by prolonged alcohol abuse.      SECONDARY DISCHARGE DIAGNOSES  Diagnosis: Hypomagnesemia  Assessment and Plan of Treatment: You had low magnesium levels. You were given magnesium replacement during your hospitalization. Please follow up with your primary care physician in one week to inform them of your recent hospitalization and further management of your medical conditions.      Diagnosis: Transaminitis  Assessment and Plan of Treatment: You were found to have liver dysfunction due to alcohol use. You are advised to abstain from alcohol indefinitely. Please follow up with your primary care physician in one week to inform them of your recent hospitalization and further management of your medical conditions.       PRINCIPAL DISCHARGE DIAGNOSIS  Diagnosis: Alcohol withdrawal seizure without complication  Assessment and Plan of Treatment: You were admitted due to a zeizure episode from alcohol withrawal. Please QUIT drinking alcohol. You are recommended to continue abstinence from alcohol following your discharge as it will prevent further complications caused by prolonged alcohol abuse. You are PLEASE FOLLOW UP WITH YOUR PRIMARY CARE DOCTOR IN 1 DAY. We are sending you home on Librium 25 twice a day for 5 days. Do NOT DRINK ALCOHOL WHILE TAKING THIS MEDICATION. DO NOT DRIVE WHILE TAKING THIS MEDICATION. We also sent you home on vitamins.      SECONDARY DISCHARGE DIAGNOSES  Diagnosis: Transaminitis  Assessment and Plan of Treatment: You were found to have liver dysfunction due to alcohol use. You are advised to abstain from alcohol indefinitely. Please follow up with your primary care physician in one week to inform them of your recent hospitalization and further management of your medical conditions.      Diagnosis: Hypomagnesemia  Assessment and Plan of Treatment: You had low magnesium levels. You were given magnesium replacement during your hospitalization. Please follow up with your primary care physician in one week to inform them of your recent hospitalization and further management of your medical conditions.       PRINCIPAL DISCHARGE DIAGNOSIS  Diagnosis: Alcohol withdrawal seizure without complication  Assessment and Plan of Treatment: You were admitted due to a zeizure episode from alcohol withrawal. Please QUIT drinking alcohol. You are recommended to continue abstinence from alcohol following your discharge as it will prevent further complications caused by prolonged alcohol abuse. You are PLEASE FOLLOW UP WITH YOUR PRIMARY CARE DOCTOR IN 1 DAY. Please continue to take multivitamins, b12, and thiamine.      SECONDARY DISCHARGE DIAGNOSES  Diagnosis: Hypomagnesemia  Assessment and Plan of Treatment: You had low magnesium levels. You were given magnesium replacement during your hospitalization. Please follow up with your primary care physician in one week to inform them of your recent hospitalization and further management of your medical conditions.      Diagnosis: Transaminitis  Assessment and Plan of Treatment: You were found to have liver dysfunction due to alcohol use. You are advised to abstain from alcohol indefinitely. Please follow up with your primary care physician in one week to inform them of your recent hospitalization and further management of your medical conditions.

## 2021-06-26 NOTE — PROGRESS NOTE ADULT - SUBJECTIVE AND OBJECTIVE BOX
PGY-1 Progress Note discussed with attending    PAGER #: [[phone]] TILL 5:00 PM  PLEASE CONTACT ON CALL TEAM:  - On Call Team (Please refer to Wesley) FROM 5:00 PM - 8:30PM  - Nightfloat Team FROM 8:30 -7:30 AM    INTERVAL HPI/OVERNIGHT EVENTS:   Pt downgraded from ICU. Pt received 8mg ativan overnight. Pt this AM appears calm, notes librium helps to decrease his tremors. Pt insists that he must leave today as he needs to help his friend move. Explained to patient at length plan of treatment and risks of leaving AMA. Pt demonstrated understanding that he may have another seizure and have an intracranial bleed which may result in death. Pt notes that he must leave but he will come back. I spoke to his brother Taran Wilson to let him know that because the patient demonstrates understanding of his risks and condition and has a plan for discharge, he may leave AMA. Brother Taran will speak with the patient and attempt to convince him otherwise. Brother and the medical team are in agreement that the patient requires further inpatient medical treatment.    REVIEW OF SYSTEMS:  CONSTITUTIONAL: No fever, weight loss, or fatigue  RESPIRATORY: No cough, wheezing, chills or hemoptysis; No shortness of breath  CARDIOVASCULAR: No chest pain, palpitations, dizziness, or leg swelling  GASTROINTESTINAL: No abdominal pain. No nausea, vomiting, or hematemesis; No diarrhea or constipation. No melena or hematochezia.  GENITOURINARY: No dysuria or hematuria, urinary frequency  NEUROLOGICAL: No headaches, memory loss, loss of strength, numbness, or tremors  SKIN: No itching, burning, rashes, or lesions     Vital Signs Last 24 Hrs  T(C): 36.7 (26 Jun 2021 05:29), Max: 37.5 (25 Jun 2021 22:34)  T(F): 98.1 (26 Jun 2021 05:29), Max: 99.5 (25 Jun 2021 22:34)  HR: 85 (26 Jun 2021 05:29) (79 - 144)  BP: 125/91 (26 Jun 2021 05:29) (113/86 - 139/118)  BP(mean): 93 (25 Jun 2021 19:00) (92 - 123)  RR: 18 (26 Jun 2021 05:29) (12 - 23)  SpO2: 99% (26 Jun 2021 05:29) (89% - 100%)    PHYSICAL EXAMINATION:  GENERAL: NAD, well built  HEAD:  Atraumatic, Normocephalic  EYES:  conjunctiva and sclera clear  NECK: Supple, No JVD, Normal thyroid  CHEST/LUNG: Clear to auscultation. Clear to percussion bilaterally; No rales, rhonchi, wheezing, or rubs  HEART: Regular rate and rhythm; No murmurs, rubs, or gallops  ABDOMEN: Soft, Nontender, Nondistended; Bowel sounds present  NERVOUS SYSTEM:  Alert & Oriented X3,    EXTREMITIES:  2+ Peripheral Pulses, No clubbing, cyanosis, or edema  SKIN: warm dry                          12.5   4.19  )-----------( 139      ( 25 Jun 2021 04:51 )             37.1     06-25    140  |  105  |  4<L>  ----------------------------<  96  3.6   |  30  |  0.64    Ca    9.0      25 Jun 2021 04:51  Phos  3.8     06-25  Mg     1.6     06-25    TPro  7.0  /  Alb  3.2<L>  /  TBili  1.0  /  DBili  x   /  AST  116<H>  /  ALT  104<H>  /  AlkPhos  91  06-25    LIVER FUNCTIONS - ( 25 Jun 2021 04:51 )  Alb: 3.2 g/dL / Pro: 7.0 g/dL / ALK PHOS: 91 U/L / ALT: 104 U/L DA / AST: 116 U/L / GGT: x                   CAPILLARY BLOOD GLUCOSE      RADIOLOGY & ADDITIONAL TESTS:

## 2021-06-27 LAB
ANION GAP SERPL CALC-SCNC: 9 MMOL/L — SIGNIFICANT CHANGE UP (ref 5–17)
BUN SERPL-MCNC: 10 MG/DL — SIGNIFICANT CHANGE UP (ref 7–18)
CALCIUM SERPL-MCNC: 9.3 MG/DL — SIGNIFICANT CHANGE UP (ref 8.4–10.5)
CHLORIDE SERPL-SCNC: 102 MMOL/L — SIGNIFICANT CHANGE UP (ref 96–108)
CO2 SERPL-SCNC: 28 MMOL/L — SIGNIFICANT CHANGE UP (ref 22–31)
CREAT SERPL-MCNC: 0.64 MG/DL — SIGNIFICANT CHANGE UP (ref 0.5–1.3)
GLUCOSE SERPL-MCNC: 86 MG/DL — SIGNIFICANT CHANGE UP (ref 70–99)
HCT VFR BLD CALC: 35.8 % — LOW (ref 39–50)
HGB BLD-MCNC: 11.9 G/DL — LOW (ref 13–17)
MAGNESIUM SERPL-MCNC: 2 MG/DL — SIGNIFICANT CHANGE UP (ref 1.6–2.6)
MCHC RBC-ENTMCNC: 33.1 PG — SIGNIFICANT CHANGE UP (ref 27–34)
MCHC RBC-ENTMCNC: 33.2 GM/DL — SIGNIFICANT CHANGE UP (ref 32–36)
MCV RBC AUTO: 99.4 FL — SIGNIFICANT CHANGE UP (ref 80–100)
NRBC # BLD: 0 /100 WBCS — SIGNIFICANT CHANGE UP (ref 0–0)
PHOSPHATE SERPL-MCNC: 2.7 MG/DL — SIGNIFICANT CHANGE UP (ref 2.5–4.5)
PLATELET # BLD AUTO: 212 K/UL — SIGNIFICANT CHANGE UP (ref 150–400)
POTASSIUM SERPL-MCNC: 4 MMOL/L — SIGNIFICANT CHANGE UP (ref 3.5–5.3)
POTASSIUM SERPL-SCNC: 4 MMOL/L — SIGNIFICANT CHANGE UP (ref 3.5–5.3)
RBC # BLD: 3.6 M/UL — LOW (ref 4.2–5.8)
RBC # FLD: 11.3 % — SIGNIFICANT CHANGE UP (ref 10.3–14.5)
SODIUM SERPL-SCNC: 139 MMOL/L — SIGNIFICANT CHANGE UP (ref 135–145)
WBC # BLD: 4.8 K/UL — SIGNIFICANT CHANGE UP (ref 3.8–10.5)
WBC # FLD AUTO: 4.8 K/UL — SIGNIFICANT CHANGE UP (ref 3.8–10.5)

## 2021-06-27 PROCEDURE — 99233 SBSQ HOSP IP/OBS HIGH 50: CPT | Mod: GC

## 2021-06-27 RX ORDER — THIAMINE MONONITRATE (VIT B1) 100 MG
1 TABLET ORAL
Qty: 14 | Refills: 0
Start: 2021-06-27 | End: 2021-07-10

## 2021-06-27 RX ADMIN — Medication 1 PATCH: at 19:41

## 2021-06-27 RX ADMIN — Medication 50 MILLIGRAM(S): at 06:24

## 2021-06-27 RX ADMIN — Medication 1 MILLIGRAM(S): at 11:57

## 2021-06-27 RX ADMIN — ENOXAPARIN SODIUM 40 MILLIGRAM(S): 100 INJECTION SUBCUTANEOUS at 11:57

## 2021-06-27 RX ADMIN — Medication 1 PATCH: at 06:54

## 2021-06-27 RX ADMIN — Medication 1 TABLET(S): at 11:57

## 2021-06-27 RX ADMIN — PANTOPRAZOLE SODIUM 40 MILLIGRAM(S): 20 TABLET, DELAYED RELEASE ORAL at 06:24

## 2021-06-27 RX ADMIN — Medication 1 PATCH: at 16:01

## 2021-06-27 RX ADMIN — CHLORHEXIDINE GLUCONATE 1 APPLICATION(S): 213 SOLUTION TOPICAL at 06:26

## 2021-06-27 RX ADMIN — Medication 4 MILLIGRAM(S): at 06:29

## 2021-06-27 RX ADMIN — Medication 1 PATCH: at 11:57

## 2021-06-27 NOTE — PROGRESS NOTE ADULT - PROBLEM SELECTOR PLAN 2
Elevated LFTs AST/ALT >2:1 - alcohol pattern  - T Bili 1. Direct bili 0.5  - RUQ US negative for cholelithiasis or CBD dilation   - Trended down

## 2021-06-27 NOTE — PROGRESS NOTE ADULT - PROBLEM SELECTOR PLAN 1
Admitted for alcohol withdrawal seizures s/p ICU transfer for precedex gtt. Transferred to floor with librium taper. CIWA 1-2 with librium taper.   - discussed with patient's brother patient will not have support and accountability at home, will make sure patient off librium prior to dc  - plan to dc librium and monitor for 24 hours  - Ativan PRN 2q2 for CIQA  - c/w IV Thiamine, Folate, Multivitamins  - EEG: No epileptiform pattern or seizure seen  - per neurology eval, no plan for anti-seizure medication on discharge

## 2021-06-27 NOTE — PROGRESS NOTE ADULT - SUBJECTIVE AND OBJECTIVE BOX
Patient is a 36y old  Male who presents with a chief complaint of seizure (26 Jun 2021 10:00)      SUBJECTIVE / OVERNIGHT EVENTS: RUPAL overnight. Patient is tolerating diet. Wants to go home, discussed with patient about librium taper, says that he wants to quit and his family will keep him accountable. Called brother who said patient cannot be trusted to be accountable, both parents have dementia. Discussed to watch patient off librium today if no signs of withdrawal plan for dc tomorrow     ADDITIONAL REVIEW OF SYSTEMS: negative as per above    MEDICATIONS  (STANDING):  chlorhexidine 2% Cloths 1 Application(s) Topical <User Schedule>  dextrose 50% Injectable 25 Gram(s) IV Push once  enoxaparin Injectable 40 milliGRAM(s) SubCutaneous daily  folic acid 1 milliGRAM(s) Oral daily  multivitamin 1 Tablet(s) Oral daily  nicotine  Polacrilex Gum 4 milliGRAM(s) Oral every 12 hours  nicotine - 21 mG/24Hr(s) Patch 1 patch Transdermal daily  pantoprazole    Tablet 40 milliGRAM(s) Oral before breakfast    MEDICATIONS  (PRN):  acetaminophen   Tablet .. 650 milliGRAM(s) Oral every 8 hours PRN Temp greater or equal to 38C (100.4F)  LORazepam   Injectable 2 milliGRAM(s) IV Push every 2 hours PRN CIWA-Ar score 7 or greater  ondansetron Injectable 4 milliGRAM(s) IV Push every 6 hours PRN Nausea and/or Vomiting      CAPILLARY BLOOD GLUCOSE        I&O's Summary      PHYSICAL EXAM:  Vital Signs Last 24 Hrs  T(C): 36.7 (27 Jun 2021 13:30), Max: 36.9 (27 Jun 2021 04:29)  T(F): 98.1 (27 Jun 2021 13:30), Max: 98.4 (27 Jun 2021 04:29)  HR: 86 (27 Jun 2021 13:30) (67 - 89)  BP: 105/77 (27 Jun 2021 13:30) (105/77 - 125/80)  BP(mean): --  RR: 18 (27 Jun 2021 13:30) (17 - 18)  SpO2: 99% (27 Jun 2021 13:30) (98% - 99%)    GENERAL: NAD,    CHEST/LUNG: Clear to auscultation bilaterally; No wheeze  HEART: Regular rate and rhythm; No murmurs, rubs, or gallops  ABDOMEN: Soft, Nontender, Nondistended; Bowel sounds present  EXTREMITIES:  2+ Peripheral Pulses, No clubbing, cyanosis, or edema  PSYCH: AAOx3  NEUROLOGY: non-focal  SKIN: No rashes or lesions    LABS:                        11.9   4.80  )-----------( 212      ( 27 Jun 2021 06:13 )             35.8     06-27    139  |  102  |  10  ----------------------------<  86  4.0   |  28  |  0.64    Ca    9.3      27 Jun 2021 06:13  Phos  2.7     06-27  Mg     2.0     06-27                Trend Procalcitonin        Ferritin, Serum: 940 ng/mL (06-22-21 @ 10:14)    D-Dimer:      RADIOLOGY & ADDITIONAL TESTS:  Results Reviewed:   Imaging Personally Reviewed:  Electrocardiogram Personally Reviewed:    COORDINATION OF CARE:  Care Discussed with Consultants/Other Providers [Y/N]:  Prior or Outpatient Records Reviewed [Y/N]:

## 2021-06-28 ENCOUNTER — TRANSCRIPTION ENCOUNTER (OUTPATIENT)
Age: 36
End: 2021-06-28

## 2021-06-28 VITALS
SYSTOLIC BLOOD PRESSURE: 104 MMHG | HEART RATE: 92 BPM | DIASTOLIC BLOOD PRESSURE: 73 MMHG | OXYGEN SATURATION: 100 % | RESPIRATION RATE: 18 BRPM | TEMPERATURE: 98 F

## 2021-06-28 LAB
ANION GAP SERPL CALC-SCNC: 7 MMOL/L — SIGNIFICANT CHANGE UP (ref 5–17)
BUN SERPL-MCNC: 8 MG/DL — SIGNIFICANT CHANGE UP (ref 7–18)
CALCIUM SERPL-MCNC: 10.1 MG/DL — SIGNIFICANT CHANGE UP (ref 8.4–10.5)
CHLORIDE SERPL-SCNC: 102 MMOL/L — SIGNIFICANT CHANGE UP (ref 96–108)
CO2 SERPL-SCNC: 29 MMOL/L — SIGNIFICANT CHANGE UP (ref 22–31)
CREAT SERPL-MCNC: 0.67 MG/DL — SIGNIFICANT CHANGE UP (ref 0.5–1.3)
GLUCOSE SERPL-MCNC: 79 MG/DL — SIGNIFICANT CHANGE UP (ref 70–99)
HCT VFR BLD CALC: 38.6 % — LOW (ref 39–50)
HGB BLD-MCNC: 12.9 G/DL — LOW (ref 13–17)
MAGNESIUM SERPL-MCNC: 2 MG/DL — SIGNIFICANT CHANGE UP (ref 1.6–2.6)
MCHC RBC-ENTMCNC: 33.2 PG — SIGNIFICANT CHANGE UP (ref 27–34)
MCHC RBC-ENTMCNC: 33.4 GM/DL — SIGNIFICANT CHANGE UP (ref 32–36)
MCV RBC AUTO: 99.5 FL — SIGNIFICANT CHANGE UP (ref 80–100)
NRBC # BLD: 0 /100 WBCS — SIGNIFICANT CHANGE UP (ref 0–0)
PHOSPHATE SERPL-MCNC: 3.7 MG/DL — SIGNIFICANT CHANGE UP (ref 2.5–4.5)
PLATELET # BLD AUTO: 296 K/UL — SIGNIFICANT CHANGE UP (ref 150–400)
POTASSIUM SERPL-MCNC: 4.2 MMOL/L — SIGNIFICANT CHANGE UP (ref 3.5–5.3)
POTASSIUM SERPL-SCNC: 4.2 MMOL/L — SIGNIFICANT CHANGE UP (ref 3.5–5.3)
RBC # BLD: 3.88 M/UL — LOW (ref 4.2–5.8)
RBC # FLD: 11.4 % — SIGNIFICANT CHANGE UP (ref 10.3–14.5)
SODIUM SERPL-SCNC: 138 MMOL/L — SIGNIFICANT CHANGE UP (ref 135–145)
WBC # BLD: 4.93 K/UL — SIGNIFICANT CHANGE UP (ref 3.8–10.5)
WBC # FLD AUTO: 4.93 K/UL — SIGNIFICANT CHANGE UP (ref 3.8–10.5)

## 2021-06-28 PROCEDURE — 93005 ELECTROCARDIOGRAM TRACING: CPT

## 2021-06-28 PROCEDURE — 85045 AUTOMATED RETICULOCYTE COUNT: CPT

## 2021-06-28 PROCEDURE — 83735 ASSAY OF MAGNESIUM: CPT

## 2021-06-28 PROCEDURE — 80076 HEPATIC FUNCTION PANEL: CPT

## 2021-06-28 PROCEDURE — 84478 ASSAY OF TRIGLYCERIDES: CPT

## 2021-06-28 PROCEDURE — 85027 COMPLETE CBC AUTOMATED: CPT

## 2021-06-28 PROCEDURE — 87040 BLOOD CULTURE FOR BACTERIA: CPT

## 2021-06-28 PROCEDURE — 71045 X-RAY EXAM CHEST 1 VIEW: CPT

## 2021-06-28 PROCEDURE — 81001 URINALYSIS AUTO W/SCOPE: CPT

## 2021-06-28 PROCEDURE — 82746 ASSAY OF FOLIC ACID SERUM: CPT

## 2021-06-28 PROCEDURE — 84484 ASSAY OF TROPONIN QUANT: CPT

## 2021-06-28 PROCEDURE — 82728 ASSAY OF FERRITIN: CPT

## 2021-06-28 PROCEDURE — 36415 COLL VENOUS BLD VENIPUNCTURE: CPT

## 2021-06-28 PROCEDURE — 97530 THERAPEUTIC ACTIVITIES: CPT

## 2021-06-28 PROCEDURE — 86769 SARS-COV-2 COVID-19 ANTIBODY: CPT

## 2021-06-28 PROCEDURE — 83550 IRON BINDING TEST: CPT

## 2021-06-28 PROCEDURE — 82607 VITAMIN B-12: CPT

## 2021-06-28 PROCEDURE — 80053 COMPREHEN METABOLIC PANEL: CPT

## 2021-06-28 PROCEDURE — 95819 EEG AWAKE AND ASLEEP: CPT

## 2021-06-28 PROCEDURE — 80048 BASIC METABOLIC PNL TOTAL CA: CPT

## 2021-06-28 PROCEDURE — 99239 HOSP IP/OBS DSCHRG MGMT >30: CPT | Mod: GC

## 2021-06-28 PROCEDURE — 82977 ASSAY OF GGT: CPT

## 2021-06-28 PROCEDURE — 96374 THER/PROPH/DIAG INJ IV PUSH: CPT

## 2021-06-28 PROCEDURE — 87086 URINE CULTURE/COLONY COUNT: CPT

## 2021-06-28 PROCEDURE — 83036 HEMOGLOBIN GLYCOSYLATED A1C: CPT

## 2021-06-28 PROCEDURE — 82962 GLUCOSE BLOOD TEST: CPT

## 2021-06-28 PROCEDURE — 99285 EMERGENCY DEPT VISIT HI MDM: CPT | Mod: 25

## 2021-06-28 PROCEDURE — 95957 EEG DIGITAL ANALYSIS: CPT

## 2021-06-28 PROCEDURE — 97162 PT EVAL MOD COMPLEX 30 MIN: CPT

## 2021-06-28 PROCEDURE — 87635 SARS-COV-2 COVID-19 AMP PRB: CPT

## 2021-06-28 PROCEDURE — 85025 COMPLETE CBC W/AUTO DIFF WBC: CPT

## 2021-06-28 PROCEDURE — 76705 ECHO EXAM OF ABDOMEN: CPT

## 2021-06-28 PROCEDURE — 84443 ASSAY THYROID STIM HORMONE: CPT

## 2021-06-28 PROCEDURE — 80061 LIPID PANEL: CPT

## 2021-06-28 PROCEDURE — 80307 DRUG TEST PRSMV CHEM ANLYZR: CPT

## 2021-06-28 PROCEDURE — 97116 GAIT TRAINING THERAPY: CPT

## 2021-06-28 PROCEDURE — 83605 ASSAY OF LACTIC ACID: CPT

## 2021-06-28 PROCEDURE — 83540 ASSAY OF IRON: CPT

## 2021-06-28 PROCEDURE — 83690 ASSAY OF LIPASE: CPT

## 2021-06-28 PROCEDURE — 84100 ASSAY OF PHOSPHORUS: CPT

## 2021-06-28 RX ADMIN — Medication 4 MILLIGRAM(S): at 06:23

## 2021-06-28 RX ADMIN — Medication 1 PATCH: at 07:03

## 2021-06-28 RX ADMIN — CHLORHEXIDINE GLUCONATE 1 APPLICATION(S): 213 SOLUTION TOPICAL at 06:23

## 2021-06-28 RX ADMIN — PANTOPRAZOLE SODIUM 40 MILLIGRAM(S): 20 TABLET, DELAYED RELEASE ORAL at 06:23

## 2021-06-28 RX ADMIN — Medication 1 TABLET(S): at 14:40

## 2021-06-28 RX ADMIN — Medication 1 MILLIGRAM(S): at 14:39

## 2021-06-28 RX ADMIN — ENOXAPARIN SODIUM 40 MILLIGRAM(S): 100 INJECTION SUBCUTANEOUS at 14:40

## 2021-06-28 NOTE — PROGRESS NOTE ADULT - SUBJECTIVE AND OBJECTIVE BOX
PGY-1 Progress Note discussed with attending    PAGER #: [[phone]] TILL 5:00 PM  PLEASE CONTACT ON CALL TEAM:  - On Call Team (Please refer to Wesley) FROM 5:00 PM - 8:30PM  - Nightfloat Team FROM 8:30 -7:30 AM    INTERVAL HPI/OVERNIGHT EVENTS:   No acute events overnight. Librium last dose 6/27 6AM. Pt for discharge today.    REVIEW OF SYSTEMS:  CONSTITUTIONAL: No fever, weight loss, or fatigue  RESPIRATORY: No cough, wheezing, chills or hemoptysis; No shortness of breath  CARDIOVASCULAR: No chest pain, palpitations, dizziness, or leg swelling  GASTROINTESTINAL: No abdominal pain. No nausea, vomiting, or hematemesis; No diarrhea or constipation. No melena or hematochezia.  GENITOURINARY: No dysuria or hematuria, urinary frequency  NEUROLOGICAL: No headaches, memory loss, loss of strength, numbness, or tremors  SKIN: No itching, burning, rashes, or lesions     Vital Signs Last 24 Hrs  T(C): 36.9 (28 Jun 2021 05:15), Max: 36.9 (28 Jun 2021 05:15)  T(F): 98.5 (28 Jun 2021 05:15), Max: 98.5 (28 Jun 2021 05:15)  HR: 86 (28 Jun 2021 05:15) (74 - 86)  BP: 114/72 (28 Jun 2021 05:15) (105/77 - 120/80)  BP(mean): --  RR: 19 (28 Jun 2021 05:15) (17 - 19)  SpO2: 99% (28 Jun 2021 05:15) (99% - 100%)    PHYSICAL EXAMINATION:  GENERAL: NAD, well built  HEAD:  Atraumatic, Normocephalic  EYES:  conjunctiva and sclera clear  NECK: Supple, No JVD, Normal thyroid  CHEST/LUNG: Clear to auscultation. Clear to percussion bilaterally; No rales, rhonchi, wheezing, or rubs  HEART: Regular rate and rhythm; No murmurs, rubs, or gallops  ABDOMEN: Soft, Nontender, Nondistended; Bowel sounds present  NERVOUS SYSTEM:  Alert & Oriented X3,    EXTREMITIES:  2+ Peripheral Pulses, No clubbing, cyanosis, or edema  SKIN: warm dry                          12.9   4.93  )-----------( 296      ( 28 Jun 2021 07:16 )             38.6     06-28    138  |  102  |  8   ----------------------------<  79  4.2   |  29  |  0.67    Ca    10.1      28 Jun 2021 07:16  Phos  3.7     06-28  Mg     2.0     06-28                CAPILLARY BLOOD GLUCOSE      RADIOLOGY & ADDITIONAL TESTS:

## 2021-06-28 NOTE — SBIRT NOTE ADULT - NSSBIRTALCPASSREFTXDET_GEN_A_CORE
Patient reports he recently completed an alcohol treatment program and is preparing to start one again; declined the need for SW intervention; explained that ED SW provided outpatient resources

## 2021-06-28 NOTE — DISCHARGE NOTE NURSING/CASE MANAGEMENT/SOCIAL WORK - PATIENT PORTAL LINK FT
You can access the FollowMyHealth Patient Portal offered by [facility] by registering at the following website: [email or web address]. By joining [facility]’s FollowMyHealth portal, you will also be able to view your health information using other applications (apps) compatible with our system.

## 2021-06-28 NOTE — PROGRESS NOTE ADULT - PROVIDER SPECIALTY LIST ADULT
Critical Care
Hospitalist
Internal Medicine
Critical Care
Critical Care
Internal Medicine
Internal Medicine

## 2021-06-28 NOTE — PROGRESS NOTE ADULT - PROBLEM SELECTOR PLAN 1
- Admitted for alcohol withdrawal seizures  - d/c ativan + librium  - Bal < 3 on admission, last drink 2 days PTA   - c/w [facility] protocol  - c/w IV Thiamine, Folate, Multivitamins  - B12 & Folate levels wnl  - Precedex drip off  - EEG: No epileptiform pattern or seizure seen  - Pt keeps insisting that he will go home to help his friend move. Explained to patient risks of leaving AMA and he demonstrated understanding. Spoke with patient brother Taran Wilson that pt may leave AMA, but will try to convince otherwise.  - Neuro Dr. Dailey  - ID home

## 2021-06-28 NOTE — PROGRESS NOTE ADULT - NUTRITIONAL ASSESSMENT
This patient has been assessed with a concern for Malnutrition and has been determined to have a diagnosis/diagnoses of Severe protein-calorie malnutrition and Underweight (BMI < 19).    This patient is being managed with:   Diet Regular-  Supplement Feeding Modality:  Oral  Ensure Enlive Cans or Servings Per Day:  1       Frequency:  Two Times a day  Entered: Jun 25 2021  2:56PM    

## 2021-06-28 NOTE — PROGRESS NOTE ADULT - PROBLEM SELECTOR PROBLEM 1
Alcohol withdrawal seizure without complication

## 2021-06-29 LAB — DRUG SCREEN, SERUM: SIGNIFICANT CHANGE UP

## 2021-07-31 ENCOUNTER — INPATIENT (INPATIENT)
Facility: HOSPITAL | Age: 36
LOS: 3 days | Discharge: HOME | End: 2021-08-04
Attending: HOSPITALIST | Admitting: HOSPITALIST
Payer: MEDICAID

## 2021-07-31 ENCOUNTER — TRANSCRIPTION ENCOUNTER (OUTPATIENT)
Age: 36
End: 2021-07-31

## 2021-07-31 VITALS
DIASTOLIC BLOOD PRESSURE: 86 MMHG | TEMPERATURE: 98 F | HEART RATE: 110 BPM | SYSTOLIC BLOOD PRESSURE: 122 MMHG | HEIGHT: 70 IN | RESPIRATION RATE: 18 BRPM | OXYGEN SATURATION: 100 %

## 2021-07-31 LAB
ALBUMIN SERPL ELPH-MCNC: 5.1 G/DL — HIGH (ref 3.3–5)
ALP SERPL-CCNC: 105 U/L — SIGNIFICANT CHANGE UP (ref 40–120)
ALT FLD-CCNC: 25 U/L — SIGNIFICANT CHANGE UP (ref 4–41)
ANION GAP SERPL CALC-SCNC: 20 MMOL/L — HIGH (ref 7–14)
AST SERPL-CCNC: 49 U/L — HIGH (ref 4–40)
BASOPHILS # BLD AUTO: 0.03 K/UL — SIGNIFICANT CHANGE UP (ref 0–0.2)
BASOPHILS NFR BLD AUTO: 0.4 % — SIGNIFICANT CHANGE UP (ref 0–2)
BILIRUB SERPL-MCNC: 1.2 MG/DL — SIGNIFICANT CHANGE UP (ref 0.2–1.2)
BUN SERPL-MCNC: 6 MG/DL — LOW (ref 7–23)
CALCIUM SERPL-MCNC: 10.1 MG/DL — SIGNIFICANT CHANGE UP (ref 8.4–10.5)
CHLORIDE SERPL-SCNC: 93 MMOL/L — LOW (ref 98–107)
CO2 SERPL-SCNC: 24 MMOL/L — SIGNIFICANT CHANGE UP (ref 22–31)
CREAT SERPL-MCNC: 0.58 MG/DL — SIGNIFICANT CHANGE UP (ref 0.5–1.3)
EOSINOPHIL # BLD AUTO: 0.19 K/UL — SIGNIFICANT CHANGE UP (ref 0–0.5)
EOSINOPHIL NFR BLD AUTO: 2.8 % — SIGNIFICANT CHANGE UP (ref 0–6)
ETHANOL SERPL-MCNC: <10 MG/DL — SIGNIFICANT CHANGE UP
GLUCOSE SERPL-MCNC: 118 MG/DL — HIGH (ref 70–99)
HCT VFR BLD CALC: 39.6 % — SIGNIFICANT CHANGE UP (ref 39–50)
HGB BLD-MCNC: 13 G/DL — SIGNIFICANT CHANGE UP (ref 13–17)
IANC: 5.29 K/UL — SIGNIFICANT CHANGE UP (ref 1.5–8.5)
IMM GRANULOCYTES NFR BLD AUTO: 0.4 % — SIGNIFICANT CHANGE UP (ref 0–1.5)
LIDOCAIN IGE QN: 146 U/L — HIGH (ref 7–60)
LYMPHOCYTES # BLD AUTO: 0.85 K/UL — LOW (ref 1–3.3)
LYMPHOCYTES # BLD AUTO: 12.5 % — LOW (ref 13–44)
MCHC RBC-ENTMCNC: 31.7 PG — SIGNIFICANT CHANGE UP (ref 27–34)
MCHC RBC-ENTMCNC: 32.8 GM/DL — SIGNIFICANT CHANGE UP (ref 32–36)
MCV RBC AUTO: 96.6 FL — SIGNIFICANT CHANGE UP (ref 80–100)
MONOCYTES # BLD AUTO: 0.39 K/UL — SIGNIFICANT CHANGE UP (ref 0–0.9)
MONOCYTES NFR BLD AUTO: 5.8 % — SIGNIFICANT CHANGE UP (ref 2–14)
NEUTROPHILS # BLD AUTO: 5.29 K/UL — SIGNIFICANT CHANGE UP (ref 1.8–7.4)
NEUTROPHILS NFR BLD AUTO: 78.1 % — HIGH (ref 43–77)
NRBC # BLD: 0 /100 WBCS — SIGNIFICANT CHANGE UP
NRBC # FLD: 0 K/UL — SIGNIFICANT CHANGE UP
PCP SPEC-MCNC: SIGNIFICANT CHANGE UP
PLATELET # BLD AUTO: 100 K/UL — LOW (ref 150–400)
POTASSIUM SERPL-MCNC: 4 MMOL/L — SIGNIFICANT CHANGE UP (ref 3.5–5.3)
POTASSIUM SERPL-SCNC: 4 MMOL/L — SIGNIFICANT CHANGE UP (ref 3.5–5.3)
PROT SERPL-MCNC: 8.4 G/DL — HIGH (ref 6–8.3)
RBC # BLD: 4.1 M/UL — LOW (ref 4.2–5.8)
RBC # FLD: 12.1 % — SIGNIFICANT CHANGE UP (ref 10.3–14.5)
SARS-COV-2 RNA SPEC QL NAA+PROBE: SIGNIFICANT CHANGE UP
SODIUM SERPL-SCNC: 137 MMOL/L — SIGNIFICANT CHANGE UP (ref 135–145)
WBC # BLD: 6.78 K/UL — SIGNIFICANT CHANGE UP (ref 3.8–10.5)
WBC # FLD AUTO: 6.78 K/UL — SIGNIFICANT CHANGE UP (ref 3.8–10.5)

## 2021-07-31 PROCEDURE — 71045 X-RAY EXAM CHEST 1 VIEW: CPT | Mod: 26

## 2021-07-31 PROCEDURE — 99291 CRITICAL CARE FIRST HOUR: CPT

## 2021-07-31 PROCEDURE — 74177 CT ABD & PELVIS W/CONTRAST: CPT | Mod: 26

## 2021-07-31 RX ORDER — SODIUM CHLORIDE 9 MG/ML
1000 INJECTION INTRAMUSCULAR; INTRAVENOUS; SUBCUTANEOUS ONCE
Refills: 0 | Status: COMPLETED | OUTPATIENT
Start: 2021-07-31 | End: 2021-07-31

## 2021-07-31 RX ORDER — ONDANSETRON 8 MG/1
4 TABLET, FILM COATED ORAL ONCE
Refills: 0 | Status: COMPLETED | OUTPATIENT
Start: 2021-07-31 | End: 2021-07-31

## 2021-07-31 RX ADMIN — SODIUM CHLORIDE 1000 MILLILITER(S): 9 INJECTION INTRAMUSCULAR; INTRAVENOUS; SUBCUTANEOUS at 18:16

## 2021-07-31 RX ADMIN — Medication 2 MILLIGRAM(S): at 18:16

## 2021-07-31 RX ADMIN — ONDANSETRON 4 MILLIGRAM(S): 8 TABLET, FILM COATED ORAL at 18:16
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
## 2021-07-31 NOTE — ED ADULT NURSE REASSESSMENT NOTE - NS ED NURSE REASSESS COMMENT FT1
Pt awake and alert x 3 calm and cooperative mild tremor noted, no co headaches nausea or vomit noted. fluids infusing awaiting dipo.

## 2021-07-31 NOTE — ED ADULT NURSE NOTE - NSIMPLEMENTINTERV_GEN_ALL_ED
Implemented All Universal Safety Interventions:  Fel[place] to call system. Call bell, personal items and telephone within reach. Instruct patient to call for assistance. Room bathroom lighting operational. Non-slip footwear when patient is off stretcher. Physically safe environment: no spills, clutter or unnecessary equipment. Stretcher in lowest position, wheels locked, appropriate side rails in place.

## 2021-07-31 NOTE — ED PROVIDER NOTE - PROGRESS NOTE DETAILS
PGY 1 Aniceto Nelson: Work up thus far unrevealing for acute findings, but pt requesting admission for detox. Given hx of withdrawal seizures, will admit. pt accepted to hospitalist for admission.

## 2021-07-31 NOTE — ED PROVIDER NOTE - CLINICAL SUMMARY MEDICAL DECISION MAKING FREE TEXT BOX
Patient is a 37 y/o with PMHx of alcohol abuse disorder (prior DT) who presents with nausea, vomiting, and tremors concerning for Alcohol withdrawl.     Plan   - Blood Alc level, CBC, CMP, UDS, Lipase

## 2021-07-31 NOTE — ED PROVIDER NOTE - NS ED ROS FT
GENERAL: No fever or chills  EYES: no change in vision  HEENT: no trouble swallowing or speaking  CARDIAC: no chest pain or palpiations   PULMONARY: no cough or SOB  GI: no abdominal pain, nausea, vomiting, diarrhea, or constipation   : No changes in urination  SKIN: no rashes  NEURO: no headache, numbness, or weakness.  MSK: rib pain
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
## 2021-07-31 NOTE — ED PROVIDER NOTE - PHYSICAL EXAMINATION
Gen: AAOx3, non-toxic  Head: NCAT  HEENT: EOMI, oral mucosa moist, normal conjunctiva  Lung: CTAB, no respiratory distress, no wheezes/rhonchi/rales B/L, speaking in full sentences  CV: RRR, no murmurs, rubs or gallops  Abd: soft, NTND, no guarding, no CVA tenderness  MSK: + rib pain   Neuro: No focal sensory or motor deficits, normal CN exam   Skin: Warm, well perfused, no rash  Psych: normal affect.

## 2021-07-31 NOTE — ED ADULT TRIAGE NOTE - AS HEIGHT TYPE
I called and spoke with the patient's mother Ila and informed her that the patient's [employer] prescription form was faxed over to the office listed fax number on yesterday 01/25/2021.  
stated

## 2021-08-01 DIAGNOSIS — E87.2 ACIDOSIS: ICD-10-CM

## 2021-08-01 DIAGNOSIS — E83.42 HYPOMAGNESEMIA: ICD-10-CM

## 2021-08-01 DIAGNOSIS — F10.29 ALCOHOL DEPENDENCE WITH UNSPECIFIED ALCOHOL-INDUCED DISORDER: ICD-10-CM

## 2021-08-01 DIAGNOSIS — R11.2 NAUSEA WITH VOMITING, UNSPECIFIED: ICD-10-CM

## 2021-08-01 DIAGNOSIS — F10.10 ALCOHOL ABUSE, UNCOMPLICATED: ICD-10-CM

## 2021-08-01 DIAGNOSIS — R63.8 OTHER SYMPTOMS AND SIGNS CONCERNING FOOD AND FLUID INTAKE: ICD-10-CM

## 2021-08-01 DIAGNOSIS — D69.6 THROMBOCYTOPENIA, UNSPECIFIED: ICD-10-CM

## 2021-08-01 DIAGNOSIS — Z29.9 ENCOUNTER FOR PROPHYLACTIC MEASURES, UNSPECIFIED: ICD-10-CM

## 2021-08-01 LAB
ALBUMIN SERPL ELPH-MCNC: 4.7 G/DL — SIGNIFICANT CHANGE UP (ref 3.3–5)
ALP SERPL-CCNC: 96 U/L — SIGNIFICANT CHANGE UP (ref 40–120)
ALT FLD-CCNC: 24 U/L — SIGNIFICANT CHANGE UP (ref 4–41)
ANION GAP SERPL CALC-SCNC: 15 MMOL/L — HIGH (ref 7–14)
AST SERPL-CCNC: 39 U/L — SIGNIFICANT CHANGE UP (ref 4–40)
BILIRUB SERPL-MCNC: 1.2 MG/DL — SIGNIFICANT CHANGE UP (ref 0.2–1.2)
BUN SERPL-MCNC: 6 MG/DL — LOW (ref 7–23)
CALCIUM SERPL-MCNC: 9.8 MG/DL — SIGNIFICANT CHANGE UP (ref 8.4–10.5)
CHLORIDE SERPL-SCNC: 96 MMOL/L — LOW (ref 98–107)
CO2 SERPL-SCNC: 25 MMOL/L — SIGNIFICANT CHANGE UP (ref 22–31)
CREAT SERPL-MCNC: 0.61 MG/DL — SIGNIFICANT CHANGE UP (ref 0.5–1.3)
GLUCOSE SERPL-MCNC: 103 MG/DL — HIGH (ref 70–99)
HCT VFR BLD CALC: 41.5 % — SIGNIFICANT CHANGE UP (ref 39–50)
HGB BLD-MCNC: 13.9 G/DL — SIGNIFICANT CHANGE UP (ref 13–17)
LG PLATELETS BLD QL AUTO: SLIGHT — SIGNIFICANT CHANGE UP
MAGNESIUM SERPL-MCNC: 1.3 MG/DL — LOW (ref 1.6–2.6)
MAGNESIUM SERPL-MCNC: 1.4 MG/DL — LOW (ref 1.6–2.6)
MANUAL SMEAR VERIFICATION: SIGNIFICANT CHANGE UP
MCHC RBC-ENTMCNC: 32 PG — SIGNIFICANT CHANGE UP (ref 27–34)
MCHC RBC-ENTMCNC: 33.5 GM/DL — SIGNIFICANT CHANGE UP (ref 32–36)
MCV RBC AUTO: 95.6 FL — SIGNIFICANT CHANGE UP (ref 80–100)
NRBC # BLD: 0 /100 WBCS — SIGNIFICANT CHANGE UP
NRBC # FLD: 0 K/UL — SIGNIFICANT CHANGE UP
PHOSPHATE SERPL-MCNC: 2.8 MG/DL — SIGNIFICANT CHANGE UP (ref 2.5–4.5)
PHOSPHATE SERPL-MCNC: 3.6 MG/DL — SIGNIFICANT CHANGE UP (ref 2.5–4.5)
PLAT MORPH BLD: ABNORMAL
PLATELET # BLD AUTO: 82 K/UL — LOW (ref 150–400)
PLATELET CLUMP BLD QL SMEAR: ABNORMAL
PLATELET COUNT - ESTIMATE: ABNORMAL
POTASSIUM SERPL-MCNC: 3.4 MMOL/L — LOW (ref 3.5–5.3)
POTASSIUM SERPL-SCNC: 3.4 MMOL/L — LOW (ref 3.5–5.3)
PROT SERPL-MCNC: 8.2 G/DL — SIGNIFICANT CHANGE UP (ref 6–8.3)
RBC # BLD: 4.34 M/UL — SIGNIFICANT CHANGE UP (ref 4.2–5.8)
RBC # FLD: 11.9 % — SIGNIFICANT CHANGE UP (ref 10.3–14.5)
RBC BLD AUTO: SIGNIFICANT CHANGE UP
SODIUM SERPL-SCNC: 136 MMOL/L — SIGNIFICANT CHANGE UP (ref 135–145)
WBC # BLD: 4.14 K/UL — SIGNIFICANT CHANGE UP (ref 3.8–10.5)
WBC # FLD AUTO: 4.14 K/UL — SIGNIFICANT CHANGE UP (ref 3.8–10.5)

## 2021-08-01 PROCEDURE — 99223 1ST HOSP IP/OBS HIGH 75: CPT

## 2021-08-01 RX ORDER — FOLIC ACID 0.8 MG
1 TABLET ORAL DAILY
Refills: 0 | Status: DISCONTINUED | OUTPATIENT
Start: 2021-08-01 | End: 2021-08-04

## 2021-08-01 RX ORDER — MAGNESIUM SULFATE 500 MG/ML
2 VIAL (ML) INJECTION ONCE
Refills: 0 | Status: COMPLETED | OUTPATIENT
Start: 2021-08-01 | End: 2021-08-01

## 2021-08-01 RX ORDER — SODIUM CHLORIDE 9 MG/ML
1000 INJECTION, SOLUTION INTRAVENOUS
Refills: 0 | Status: DISCONTINUED | OUTPATIENT
Start: 2021-08-01 | End: 2021-08-02

## 2021-08-01 RX ORDER — PREGABALIN 225 MG/1
1000 CAPSULE ORAL DAILY
Refills: 0 | Status: DISCONTINUED | OUTPATIENT
Start: 2021-08-01 | End: 2021-08-04

## 2021-08-01 RX ORDER — THIAMINE MONONITRATE (VIT B1) 100 MG
100 TABLET ORAL DAILY
Refills: 0 | Status: DISCONTINUED | OUTPATIENT
Start: 2021-08-01 | End: 2021-08-04

## 2021-08-01 RX ORDER — POTASSIUM CHLORIDE 20 MEQ
20 PACKET (EA) ORAL ONCE
Refills: 0 | Status: COMPLETED | OUTPATIENT
Start: 2021-08-01 | End: 2021-08-01

## 2021-08-01 RX ADMIN — Medication 100 MILLIGRAM(S): at 11:26

## 2021-08-01 RX ADMIN — Medication 1 MILLIGRAM(S): at 11:26

## 2021-08-01 RX ADMIN — PREGABALIN 1000 MICROGRAM(S): 225 CAPSULE ORAL at 11:27

## 2021-08-01 RX ADMIN — Medication 2 MILLIGRAM(S): at 16:06

## 2021-08-01 RX ADMIN — Medication 2 MILLIGRAM(S): at 20:01

## 2021-08-01 RX ADMIN — Medication 50 MILLIGRAM(S): at 06:15

## 2021-08-01 RX ADMIN — Medication 50 GRAM(S): at 11:27

## 2021-08-01 RX ADMIN — Medication 20 MILLIEQUIVALENT(S): at 16:06

## 2021-08-01 RX ADMIN — Medication 2 MILLIGRAM(S): at 12:18

## 2021-08-01 RX ADMIN — Medication 2 MILLIGRAM(S): at 23:59

## 2021-08-01 RX ADMIN — Medication 1 TABLET(S): at 11:26

## 2021-08-01 NOTE — H&P ADULT - PROBLEM SELECTOR PLAN 5
-s/p zofran with improvement  -CT a/p negative for pancreatitis or acute findings, lipase wnl, and abdominal exam negative  -can give prn zofran as needed

## 2021-08-01 NOTE — H&P ADULT - PROBLEM SELECTOR PLAN 3
-MA w/ AG of 20, likely 2/2 starvation ketosis, chloride is lower  -s/p 1 L NS and 1 L 1/2 NS/D5  -c/w thiamine

## 2021-08-01 NOTE — PROGRESS NOTE ADULT - PROBLEM SELECTOR PLAN 4
-platelets of 100k, likely in setting of alcohol abuse  -encourage cessation as above - platelets of 100k in setting of alcohol abuse  - f/u PT and INR

## 2021-08-01 NOTE — PROGRESS NOTE ADULT - ASSESSMENT
Patient is a 36 year old pmhx of alcohol abuse prior DTs who is presenting with nausea, vomiting, and tremors. He states stopping used to drink 14, 2 ounce shots of liquor per day and states stopping 4 days ago Patient is a 36 year old pmhx of alcohol abuse prior DTs who is presenting with nausea, vomiting, and tremors. Last drink was 6 days ago. CIWA on admission was 7. Today CIWA is 1. On librium taper.

## 2021-08-01 NOTE — PROGRESS NOTE ADULT - SUBJECTIVE AND OBJECTIVE BOX
PROGRESS NOTE:   Authored by Dr. Zuleyka Lawrence MD (PGY-1). Pager [facility] [phone]/ [facility]     Patient is a 36y old  Male who presents with a chief complaint of Alcohol withdrawal (01 Aug 2021 04:19)      SUBJECTIVE / OVERNIGHT EVENTS:  No acute events overnight.     ADDITIONAL REVIEW OF SYSTEMS:  Patient denies fevers, chills, chest pain, shortness of breath, nausea, abdominal pain, diarrhea, constipation, dysuria, leg swelling, headache, light headedness.    MEDICATIONS  (STANDING):  chlordiazePOXIDE 50 milliGRAM(s) Oral every 8 hours  cyanocobalamin 1000 MICROGram(s) Oral daily  dextrose 5% + sodium chloride 0.45%. 1000 milliLiter(s) (500 mL/Hr) IV Continuous <Continuous>  folic acid 1 milliGRAM(s) Oral daily  magnesium sulfate  IVPB 2 Gram(s) IV Intermittent once  multivitamin 1 Tablet(s) Oral daily  thiamine 100 milliGRAM(s) Oral daily    MEDICATIONS  (PRN):  LORazepam   Injectable 2 milliGRAM(s) IV Push Once PRN CIWA >8      CAPILLARY BLOOD GLUCOSE        I&O's Summary      PHYSICAL EXAM:  Vital Signs Last 24 Hrs  T(C): 37.2 (01 Aug 2021 06:18), Max: 37.2 (01 Aug 2021 06:18)  T(F): 99 (01 Aug 2021 06:18), Max: 99 (01 Aug 2021 06:18)  HR: 100 (01 Aug 2021 03:55) (76 - 110)  BP: 122/83 (01 Aug 2021 06:18) (110/79 - 132/98)  BP(mean): --  RR: 18 (01 Aug 2021 06:18) (18 - 18)  SpO2: 100% (01 Aug 2021 06:18) (100% - 100%)    CONSTITUTIONAL: NAD, well-developed  RESPIRATORY: Normal respiratory effort; lungs are clear to auscultation bilaterally  CARDIOVASCULAR: Regular rate and rhythm, normal S1 and S2, no murmur/rub/gallop; No lower extremity edema; Peripheral pulses are 2+ bilaterally  ABDOMEN: Nontender to palpation, normoactive bowel sounds, no rebound/guarding; No hepatosplenomegaly  MUSCLOSKELETAL: no clubbing or cyanosis of digits; no joint swelling or tenderness to palpation  PSYCH: A+O to person, place, and time; affect appropriate    LABS:                        13.0   6.78  )-----------( 100      ( 31 Jul 2021 18:13 )             39.6     07-31    137  |  93<L>  |  6<L>  ----------------------------<  118<H>  4.0   |  24  |  0.58    Ca    10.1      31 Jul 2021 18:13  Phos  2.8     07-31  Mg     1.30     07-31    TPro  8.4<H>  /  Alb  5.1<H>  /  TBili  1.2  /  DBili  x   /  AST  49<H>  /  ALT  25  /  AlkPhos  105  07-31                Tele Reviewed:    RADIOLOGY & ADDITIONAL TESTS:  Results Reviewed:   Imaging Personally Reviewed:  Electrocardiogram Personally Reviewed:     PROGRESS NOTE:   Authored by Dr. Zuleyka Lawrence MD (PGY-1). Pager [facility] [phone]/ [facility]     Patient is a 36y old  Male who presents with a chief complaint of Alcohol withdrawal (01 Aug 2021 04:19)      SUBJECTIVE / OVERNIGHT EVENTS:  No acute events overnight. Patient says he feels good and slept through the night. Last episode of vomiting was yesterday. Denies nausea, HA, restlessness, audiovisual hallucinations, tremors, palpitations, diaphoresis, tingling, numbness. Patient reports his last drink was 6 days ago.     ADDITIONAL REVIEW OF SYSTEMS:  Patient denies fevers, chills, chest pain, shortness of breath, nausea, abdominal pain, diarrhea, constipation, dysuria, leg swelling, headache, light headedness.    MEDICATIONS  (STANDING):  chlordiazePOXIDE 50 milliGRAM(s) Oral every 8 hours  cyanocobalamin 1000 MICROGram(s) Oral daily  dextrose 5% + sodium chloride 0.45%. 1000 milliLiter(s) (500 mL/Hr) IV Continuous <Continuous>  folic acid 1 milliGRAM(s) Oral daily  magnesium sulfate  IVPB 2 Gram(s) IV Intermittent once  multivitamin 1 Tablet(s) Oral daily  thiamine 100 milliGRAM(s) Oral daily    MEDICATIONS  (PRN):  LORazepam   Injectable 2 milliGRAM(s) IV Push Once PRN CIWA >8      CAPILLARY BLOOD GLUCOSE        I&O's Summary      PHYSICAL EXAM:  Vital Signs Last 24 Hrs  T(C): 37.2 (01 Aug 2021 06:18), Max: 37.2 (01 Aug 2021 06:18)  T(F): 99 (01 Aug 2021 06:18), Max: 99 (01 Aug 2021 06:18)  HR: 100 (01 Aug 2021 03:55) (76 - 110)  BP: 122/83 (01 Aug 2021 06:18) (110/79 - 132/98)  BP(mean): --  RR: 18 (01 Aug 2021 06:18) (18 - 18)  SpO2: 100% (01 Aug 2021 06:18) (100% - 100%)    CONSTITUTIONAL: NAD, well-developed  RESPIRATORY: Normal respiratory effort; lungs are clear to auscultation bilaterally  CARDIOVASCULAR: Regular rate and rhythm, normal S1 and S2, no murmur/rub/gallop; No lower extremity edema; Peripheral pulses are 2+ bilaterally  ABDOMEN: Nontender to palpation, normoactive bowel sounds, no rebound/guarding; No hepatosplenomegaly  MUSCULOSKELETAL: no clubbing or cyanosis of digits; no joint swelling or tenderness to palpation, fine tremor of left hand when asked to extend both arms are extended  PSYCH: A+O to person, place, and time; affect appropriate, no suicidal or homicidal ideation, no audiovisual hallucinations    LABS:                        13.0   6.78  )-----------( 100      ( 31 Jul 2021 18:13 )             39.6     07-31    137  |  93<L>  |  6<L>  ----------------------------<  118<H>  4.0   |  24  |  0.58    Ca    10.1      31 Jul 2021 18:13  Phos  2.8     07-31  Mg     1.30     07-31    TPro  8.4<H>  /  Alb  5.1<H>  /  TBili  1.2  /  DBili  x   /  AST  49<H>  /  ALT  25  /  AlkPhos  105  07-31                Tele Reviewed:    RADIOLOGY & ADDITIONAL TESTS:  Results Reviewed:   Imaging Personally Reviewed:  Electrocardiogram Personally Reviewed:

## 2021-08-01 NOTE — H&P ADULT - ASSESSMENT
Patient is a 36 year old pmhx of alcohol abuse prior DTs who is presenting with nausea, vomiting, and tremors. He states stopping used to drink 14, 2 ounce shots of liquor per day and states stopping 4 days ago

## 2021-08-01 NOTE — H&P ADULT - NSHPLABSRESULTS_GEN_ALL_CORE
LABS:                         13.0   6.78  )-----------( 100      ( 31 Jul 2021 18:13 )             39.6     07-31    137  |  93<L>  |  6<L>  ----------------------------<  118<H>  4.0   |  24  |  0.58    Ca    10.1      31 Jul 2021 18:13  Phos  2.8     07-31  Mg     1.30     07-31    TPro  8.4<H>  /  Alb  5.1<H>  /  TBili  1.2  /  DBili  x   /  AST  49<H>  /  ALT  25  /  AlkPhos  105  07-31

## 2021-08-01 NOTE — H&P ADULT - HISTORY OF PRESENT ILLNESS
Patient is a 36 year old pmhx of alcohol abuse prior DTs who is presenting with nausea, vomiting, and tremors. He states stopping used to drink 14, 2 ounce shots of liquor per day and states stopping 4 days ago. He states previously not drinking for 1.5 months in duration. He denies suicidal ideation, depression, or hallucinations. He states having had DTs in the past but denies having been intubated in the past for this. He states willingness to quit drinking, denies having had naltrexone or antabuse in the past. He denies fevers, chills, nausea, vomiting, diarrhea, sob, or cp.     ED course: patient is afebrile, /96, HR , AG 20 s/p 1 L NS, and 1/2 NS D5 1 L  CIWA initially 7, 5, then 2 s/p lorazepam 2 mg iv times 1  magnesium 1.3 s/p 2 gm mag  CT a/p w iv contrast negative for acute intraabdominal pathologies, lipase 143

## 2021-08-01 NOTE — PROGRESS NOTE ADULT - PROBLEM SELECTOR PLAN 1
-patient with DTs in the past, last drink 4 days ago  -initial CIWA 7-->5--->2 at 3 am  -librium 50Q8 with taper  -ativan 2 mg Q2 prn CIWA >8  -c/w MV, FA, and B12  -pt denies suicidal ideation or depression Patient with DTs in the past, last drink 6 days ago. CIWA 1.   - ativan 2 mg Q2 prn CIWA >8  - c/w MV, FA, and B12  - discontinued librium taper due to elevated GGT of 680 on 6/22/21  - started ativan taper  - continue CIWA monitoring

## 2021-08-01 NOTE — PATIENT PROFILE ADULT - NSPROGENSOURCEINFO_GEN_A_NUR
[FreeTextEntry1] : 58 yo s/p right mastectomy flap excision/chest excision. She presents for an acute visit due to rash on right breast.  There is erythema with small vesicles along the right breast surgical site. She denies pain or itching.  This is appears to be shingles and will begin Valacyclovir. I will send email to Dr. Celeste since she is suppose to beginning radiation this week.  She will follow up in 2 weeks.  \par 1. Follow up in 2 weeks\par 2.  Valacyclovir sent to pharmacy Mother , Nadine, provided information as patient is presently very lethargic/patient/family

## 2021-08-01 NOTE — H&P ADULT - PROBLEM SELECTOR PLAN 6
F-s/p 1 L NS, and 1 L 1/2 NS and D5  E-replete K<4 and Mag <2, replete phosphorous prn  N-regular diet  IMPROVE score of 0

## 2021-08-02 DIAGNOSIS — F10.239 ALCOHOL DEPENDENCE WITH WITHDRAWAL, UNSPECIFIED: ICD-10-CM

## 2021-08-02 LAB
ALBUMIN SERPL ELPH-MCNC: 4.3 G/DL — SIGNIFICANT CHANGE UP (ref 3.3–5)
ALP SERPL-CCNC: 90 U/L — SIGNIFICANT CHANGE UP (ref 40–120)
ALT FLD-CCNC: 31 U/L — SIGNIFICANT CHANGE UP (ref 4–41)
ANION GAP SERPL CALC-SCNC: 15 MMOL/L — HIGH (ref 7–14)
AST SERPL-CCNC: 68 U/L — HIGH (ref 4–40)
BILIRUB SERPL-MCNC: 1 MG/DL — SIGNIFICANT CHANGE UP (ref 0.2–1.2)
BUN SERPL-MCNC: 11 MG/DL — SIGNIFICANT CHANGE UP (ref 7–23)
CALCIUM SERPL-MCNC: 9.7 MG/DL — SIGNIFICANT CHANGE UP (ref 8.4–10.5)
CHLORIDE SERPL-SCNC: 101 MMOL/L — SIGNIFICANT CHANGE UP (ref 98–107)
CO2 SERPL-SCNC: 21 MMOL/L — LOW (ref 22–31)
COVID-19 SPIKE DOMAIN AB INTERP: POSITIVE
COVID-19 SPIKE DOMAIN ANTIBODY RESULT: 1.87 U/ML — HIGH
CREAT SERPL-MCNC: 0.64 MG/DL — SIGNIFICANT CHANGE UP (ref 0.5–1.3)
GLUCOSE SERPL-MCNC: 86 MG/DL — SIGNIFICANT CHANGE UP (ref 70–99)
HCT VFR BLD CALC: 41.5 % — SIGNIFICANT CHANGE UP (ref 39–50)
HGB BLD-MCNC: 13.7 G/DL — SIGNIFICANT CHANGE UP (ref 13–17)
MAGNESIUM SERPL-MCNC: 1.9 MG/DL — SIGNIFICANT CHANGE UP (ref 1.6–2.6)
MCHC RBC-ENTMCNC: 31.7 PG — SIGNIFICANT CHANGE UP (ref 27–34)
MCHC RBC-ENTMCNC: 33 GM/DL — SIGNIFICANT CHANGE UP (ref 32–36)
MCV RBC AUTO: 96.1 FL — SIGNIFICANT CHANGE UP (ref 80–100)
NRBC # BLD: 0 /100 WBCS — SIGNIFICANT CHANGE UP
NRBC # FLD: 0 K/UL — SIGNIFICANT CHANGE UP
PHOSPHATE SERPL-MCNC: 4.1 MG/DL — SIGNIFICANT CHANGE UP (ref 2.5–4.5)
PLATELET # BLD AUTO: 87 K/UL — LOW (ref 150–400)
POTASSIUM SERPL-MCNC: 3.8 MMOL/L — SIGNIFICANT CHANGE UP (ref 3.5–5.3)
POTASSIUM SERPL-SCNC: 3.8 MMOL/L — SIGNIFICANT CHANGE UP (ref 3.5–5.3)
PROT SERPL-MCNC: 7.4 G/DL — SIGNIFICANT CHANGE UP (ref 6–8.3)
RBC # BLD: 4.32 M/UL — SIGNIFICANT CHANGE UP (ref 4.2–5.8)
RBC # FLD: 11.8 % — SIGNIFICANT CHANGE UP (ref 10.3–14.5)
SARS-COV-2 IGG+IGM SERPL QL IA: 1.87 U/ML — HIGH
SARS-COV-2 IGG+IGM SERPL QL IA: POSITIVE
SODIUM SERPL-SCNC: 137 MMOL/L — SIGNIFICANT CHANGE UP (ref 135–145)
WBC # BLD: 4.9 K/UL — SIGNIFICANT CHANGE UP (ref 3.8–10.5)
WBC # FLD AUTO: 4.9 K/UL — SIGNIFICANT CHANGE UP (ref 3.8–10.5)

## 2021-08-02 PROCEDURE — 99233 SBSQ HOSP IP/OBS HIGH 50: CPT | Mod: GC

## 2021-08-02 RX ADMIN — Medication 1 TABLET(S): at 11:12

## 2021-08-02 RX ADMIN — Medication 1.5 MILLIGRAM(S): at 14:05

## 2021-08-02 RX ADMIN — Medication 1.5 MILLIGRAM(S): at 21:50

## 2021-08-02 RX ADMIN — Medication 1.5 MILLIGRAM(S): at 10:16

## 2021-08-02 RX ADMIN — Medication 1.5 MILLIGRAM(S): at 18:56

## 2021-08-02 RX ADMIN — Medication 1 MILLIGRAM(S): at 11:12

## 2021-08-02 RX ADMIN — Medication 2 MILLIGRAM(S): at 07:48

## 2021-08-02 RX ADMIN — PREGABALIN 1000 MICROGRAM(S): 225 CAPSULE ORAL at 11:12

## 2021-08-02 RX ADMIN — Medication 100 MILLIGRAM(S): at 11:12

## 2021-08-02 RX ADMIN — Medication 2 MILLIGRAM(S): at 03:58

## 2021-08-02 NOTE — PROGRESS NOTE ADULT - PROBLEM SELECTOR PLAN 5
No episodes of vomiting since yesterday. Patient denies nausea. Elevated lipase. However, CT a/p negative for pancreatitis or acute findings and abdominal exam negative.    -can give prn zofran as needed

## 2021-08-02 NOTE — PROGRESS NOTE ADULT - SUBJECTIVE AND OBJECTIVE BOX
PROGRESS NOTE:   Authored by Dr. Zuleyka Lawrence MD (PGY-1). Pager [facility] [phone]/ [facility]     Patient is a 36y old  Male who presents with a chief complaint of Alcohol withdrawal (01 Aug 2021 06:30)      SUBJECTIVE / OVERNIGHT EVENTS:  No acute events overnight.     ADDITIONAL REVIEW OF SYSTEMS:  Patient denies fevers, chills, chest pain, shortness of breath, nausea, abdominal pain, diarrhea, constipation, dysuria, leg swelling, headache, light headedness.    MEDICATIONS  (STANDING):  cyanocobalamin 1000 MICROGram(s) Oral daily  dextrose 5% + sodium chloride 0.45%. 1000 milliLiter(s) (500 mL/Hr) IV Continuous <Continuous>  folic acid 1 milliGRAM(s) Oral daily  LORazepam     Tablet   Oral   LORazepam     Tablet 2 milliGRAM(s) Oral every 4 hours  LORazepam     Tablet 1.5 milliGRAM(s) Oral every 4 hours  multivitamin 1 Tablet(s) Oral daily  thiamine 100 milliGRAM(s) Oral daily    MEDICATIONS  (PRN):  LORazepam   Injectable 2 milliGRAM(s) IV Push Once PRN CIWA >8      CAPILLARY BLOOD GLUCOSE        I&O's Summary      PHYSICAL EXAM:  Vital Signs Last 24 Hrs  T(C): 36.7 (02 Aug 2021 05:30), Max: 37.1 (01 Aug 2021 09:30)  T(F): 98.1 (02 Aug 2021 05:30), Max: 98.7 (01 Aug 2021 09:30)  HR: 87 (02 Aug 2021 05:30) (83 - 100)  BP: 106/78 (02 Aug 2021 05:30) (104/72 - 127/94)  BP(mean): --  RR: 18 (02 Aug 2021 05:30) (18 - 18)  SpO2: 100% (02 Aug 2021 05:30) (100% - 100%)    CONSTITUTIONAL: NAD, well-developed  RESPIRATORY: Normal respiratory effort; lungs are clear to auscultation bilaterally  CARDIOVASCULAR: Regular rate and rhythm, normal S1 and S2, no murmur/rub/gallop; No lower extremity edema; Peripheral pulses are 2+ bilaterally  ABDOMEN: Nontender to palpation, normoactive bowel sounds, no rebound/guarding; No hepatosplenomegaly  MUSCLOSKELETAL: no clubbing or cyanosis of digits; no joint swelling or tenderness to palpation  PSYCH: A+O to person, place, and time; affect appropriate    LABS:                        13.9   4.14  )-----------( 82       ( 01 Aug 2021 12:02 )             41.5     08-01    136  |  96<L>  |  6<L>  ----------------------------<  103<H>  3.4<L>   |  25  |  0.61    Ca    9.8      01 Aug 2021 12:04  Phos  3.6     08-01  Mg     1.40     08-01    TPro  8.2  /  Alb  4.7  /  TBili  1.2  /  DBili  x   /  AST  39  /  ALT  24  /  AlkPhos  96  08-01                Tele Reviewed:    RADIOLOGY & ADDITIONAL TESTS:  Results Reviewed:   Imaging Personally Reviewed:  Electrocardiogram Personally Reviewed:     PROGRESS NOTE:   Authored by Dr. Zuleyka Lawrence MD (PGY-1). Pager [facility] [phone]/ [facility]     Patient is a 36y old  Male who presents with a chief complaint of Alcohol withdrawal (01 Aug 2021 06:30)      SUBJECTIVE / OVERNIGHT EVENTS:  No acute events overnight.     ADDITIONAL REVIEW OF SYSTEMS:  Patient denies fevers, chills, chest pain, shortness of breath, nausea, abdominal pain, diarrhea, constipation, dysuria, leg swelling, headache, light headedness.    MEDICATIONS  (STANDING):  cyanocobalamin 1000 MICROGram(s) Oral daily  dextrose 5% + sodium chloride 0.45%. 1000 milliLiter(s) (500 mL/Hr) IV Continuous <Continuous>  folic acid 1 milliGRAM(s) Oral daily  LORazepam     Tablet   Oral   LORazepam     Tablet 2 milliGRAM(s) Oral every 4 hours  LORazepam     Tablet 1.5 milliGRAM(s) Oral every 4 hours  multivitamin 1 Tablet(s) Oral daily  thiamine 100 milliGRAM(s) Oral daily    MEDICATIONS  (PRN):  LORazepam   Injectable 2 milliGRAM(s) IV Push Once PRN CIWA >8      CAPILLARY BLOOD GLUCOSE        I&O's Summary      PHYSICAL EXAM:  Vital Signs Last 24 Hrs  T(C): 36.7 (02 Aug 2021 05:30), Max: 37.1 (01 Aug 2021 09:30)  T(F): 98.1 (02 Aug 2021 05:30), Max: 98.7 (01 Aug 2021 09:30)  HR: 87 (02 Aug 2021 05:30) (83 - 100)  BP: 106/78 (02 Aug 2021 05:30) (104/72 - 127/94)  BP(mean): --  RR: 18 (02 Aug 2021 05:30) (18 - 18)  SpO2: 100% (02 Aug 2021 05:30) (100% - 100%)    PHYSICAL EXAM:  CONSTITUTIONAL: NAD, well-developed  EYES: PERRLA; conjunctiva and sclera clear  ENMT: Moist oral mucosa, no pharyngeal injection or exudates; normal dentition  NECK: Supple, no palpable masses; no thyromegaly  RESPIRATORY: Normal respiratory effort; lungs are clear to auscultation bilaterally  CARDIOVASCULAR: Regular rate and rhythm, normal S1 and S2, no murmur/rub/gallop; No lower extremity edema; Peripheral pulses are 2+ bilaterally  ABDOMEN: Nontender to palpation, normoactive bowel sounds, no rebound/guarding; No hepatosplenomegaly  MUSCULOSKELETAL:  Normal gait; no clubbing or cyanosis of digits; no joint swelling or tenderness to palpation  PSYCH: A+O to person, place, and time; affect appropriate  NEUROLOGY: CN 2-12 are intact and symmetric; no gross sensory deficits   SKIN: No rashes; no palpable lesions    LABS:                        13.9   4.14  )-----------( 82       ( 01 Aug 2021 12:02 )             41.5     08-01    136  |  96<L>  |  6<L>  ----------------------------<  103<H>  3.4<L>   |  25  |  0.61    Ca    9.8      01 Aug 2021 12:04  Phos  3.6     08-01  Mg     1.40     08-01    TPro  8.2  /  Alb  4.7  /  TBili  1.2  /  DBili  x   /  AST  39  /  ALT  24  /  AlkPhos  96  08-01                Tele Reviewed:    RADIOLOGY & ADDITIONAL TESTS:  Results Reviewed:   Imaging Personally Reviewed:  Electrocardiogram Personally Reviewed:     PROGRESS NOTE:   Authored by Dr. Zuleyka Lawrence MD (PGY-1). Pager [facility] [phone]/ [facility]     Patient is a 36y old  Male who presents with a chief complaint of Alcohol withdrawal (01 Aug 2021 06:30)      SUBJECTIVE / OVERNIGHT EVENTS:  No acute events overnight.     No complaints. Denies palpitations, diaphoresis, anxiety, restlessness, N, V, HA, audiovisual  hallucinations    ADDITIONAL REVIEW OF SYSTEMS:  Patient denies fevers, chills, chest pain, shortness of breath, nausea, abdominal pain, diarrhea, constipation, dysuria, leg swelling, headache, light headedness.    MEDICATIONS  (STANDING):  cyanocobalamin 1000 MICROGram(s) Oral daily  dextrose 5% + sodium chloride 0.45%. 1000 milliLiter(s) (500 mL/Hr) IV Continuous <Continuous>  folic acid 1 milliGRAM(s) Oral daily  LORazepam     Tablet   Oral   LORazepam     Tablet 2 milliGRAM(s) Oral every 4 hours  LORazepam     Tablet 1.5 milliGRAM(s) Oral every 4 hours  multivitamin 1 Tablet(s) Oral daily  thiamine 100 milliGRAM(s) Oral daily    MEDICATIONS  (PRN):  LORazepam   Injectable 2 milliGRAM(s) IV Push Once PRN CIWA >8      CAPILLARY BLOOD GLUCOSE        I&O's Summary      PHYSICAL EXAM:  Vital Signs Last 24 Hrs  T(C): 36.7 (02 Aug 2021 05:30), Max: 37.1 (01 Aug 2021 09:30)  T(F): 98.1 (02 Aug 2021 05:30), Max: 98.7 (01 Aug 2021 09:30)  HR: 87 (02 Aug 2021 05:30) (83 - 100)  BP: 106/78 (02 Aug 2021 05:30) (104/72 - 127/94)  BP(mean): --  RR: 18 (02 Aug 2021 05:30) (18 - 18)  SpO2: 100% (02 Aug 2021 05:30) (100% - 100%)    PHYSICAL EXAM:  CONSTITUTIONAL: NAD, well-developed  EYES: PERRLA; conjunctiva and sclera clear  ENMT: Moist oral mucosa, no pharyngeal injection or exudates; normal dentition  NECK: Supple, no palpable masses; no thyromegaly  RESPIRATORY: Normal respiratory effort; lungs are clear to auscultation bilaterally  CARDIOVASCULAR: Regular rate and rhythm, normal S1 and S2, no murmur/rub/gallop; No lower extremity edema; Peripheral pulses are 2+ bilaterally  ABDOMEN: Nontender to palpation, normoactive bowel sounds, no rebound/guarding; No hepatosplenomegaly  MUSCULOSKELETAL:  Normal gait; no clubbing or cyanosis of digits; no joint swelling or tenderness to palpation  PSYCH: A+O to person, place, and time; affect appropriate  NEUROLOGY: CN 2-12 are intact and symmetric; no gross sensory deficits   SKIN: No rashes; no palpable lesions    LABS:                        13.9   4.14  )-----------( 82       ( 01 Aug 2021 12:02 )             41.5     08-01    136  |  96<L>  |  6<L>  ----------------------------<  103<H>  3.4<L>   |  25  |  0.61    Ca    9.8      01 Aug 2021 12:04  Phos  3.6     08-01  Mg     1.40     08-01    TPro  8.2  /  Alb  4.7  /  TBili  1.2  /  DBili  x   /  AST  39  /  ALT  24  /  AlkPhos  96  08-01                Tele Reviewed:    RADIOLOGY & ADDITIONAL TESTS:  Results Reviewed:   Imaging Personally Reviewed:  Electrocardiogram Personally Reviewed:

## 2021-08-02 NOTE — PROGRESS NOTE ADULT - PROBLEM SELECTOR PLAN 1
Patient with DTs in the past, last drink 6 days ago. CIWA 1.   - ativan 2 mg Q2 prn CIWA >8  - c/w MV, FA, and B12  - discontinued librium taper due to elevated GGT of 680 on 6/22/21  - started ativan taper  - continue CIWA monitoring Patient with DTs in the past, last drink 6 days ago. CIWA 1.   - ativan 2 mg Q2 prn CIWA >8  - c/w MV, FA, and B12  - discontinued librium taper due to elevated GGT of 680 on 6/22/21  - c/w ativan taper  - continue CIWA monitoring

## 2021-08-02 NOTE — PROGRESS NOTE ADULT - ASSESSMENT
Patient is a 36 year old pmhx of alcohol abuse prior DTs who is presenting with nausea, vomiting, and tremors. Last drink was 6 days ago. CIWA on admission was 7. Today CIWA is 1. On librium taper.  Patient is a 36 year old pmhx of alcohol abuse prior DTs who is presenting with EtOH withdrawal. Last drink was 6 days prior to admission. CIWA on admission was 7. Today CIWA is 1. On ativan taper.

## 2021-08-03 LAB
ALBUMIN SERPL ELPH-MCNC: 4.3 G/DL — SIGNIFICANT CHANGE UP (ref 3.3–5)
ALP SERPL-CCNC: 90 U/L — SIGNIFICANT CHANGE UP (ref 40–120)
ALT FLD-CCNC: 59 U/L — HIGH (ref 4–41)
ANION GAP SERPL CALC-SCNC: 19 MMOL/L — HIGH (ref 7–14)
AST SERPL-CCNC: 99 U/L — HIGH (ref 4–40)
BILIRUB SERPL-MCNC: 0.9 MG/DL — SIGNIFICANT CHANGE UP (ref 0.2–1.2)
BUN SERPL-MCNC: 10 MG/DL — SIGNIFICANT CHANGE UP (ref 7–23)
CALCIUM SERPL-MCNC: 9.8 MG/DL — SIGNIFICANT CHANGE UP (ref 8.4–10.5)
CHLORIDE SERPL-SCNC: 98 MMOL/L — SIGNIFICANT CHANGE UP (ref 98–107)
CO2 SERPL-SCNC: 17 MMOL/L — LOW (ref 22–31)
CREAT SERPL-MCNC: 0.62 MG/DL — SIGNIFICANT CHANGE UP (ref 0.5–1.3)
GLUCOSE SERPL-MCNC: 86 MG/DL — SIGNIFICANT CHANGE UP (ref 70–99)
HCT VFR BLD CALC: 42.6 % — SIGNIFICANT CHANGE UP (ref 39–50)
HGB BLD-MCNC: 14.1 G/DL — SIGNIFICANT CHANGE UP (ref 13–17)
MAGNESIUM SERPL-MCNC: 1.8 MG/DL — SIGNIFICANT CHANGE UP (ref 1.6–2.6)
MCHC RBC-ENTMCNC: 31.6 PG — SIGNIFICANT CHANGE UP (ref 27–34)
MCHC RBC-ENTMCNC: 33.1 GM/DL — SIGNIFICANT CHANGE UP (ref 32–36)
MCV RBC AUTO: 95.5 FL — SIGNIFICANT CHANGE UP (ref 80–100)
NRBC # BLD: 0 /100 WBCS — SIGNIFICANT CHANGE UP
NRBC # FLD: 0 K/UL — SIGNIFICANT CHANGE UP
PHOSPHATE SERPL-MCNC: 4.4 MG/DL — SIGNIFICANT CHANGE UP (ref 2.5–4.5)
PLATELET # BLD AUTO: 105 K/UL — LOW (ref 150–400)
POTASSIUM SERPL-MCNC: 3.8 MMOL/L — SIGNIFICANT CHANGE UP (ref 3.5–5.3)
POTASSIUM SERPL-SCNC: 3.8 MMOL/L — SIGNIFICANT CHANGE UP (ref 3.5–5.3)
PROT SERPL-MCNC: 7.6 G/DL — SIGNIFICANT CHANGE UP (ref 6–8.3)
RBC # BLD: 4.46 M/UL — SIGNIFICANT CHANGE UP (ref 4.2–5.8)
RBC # FLD: 11.7 % — SIGNIFICANT CHANGE UP (ref 10.3–14.5)
SODIUM SERPL-SCNC: 134 MMOL/L — LOW (ref 135–145)
WBC # BLD: 5.82 K/UL — SIGNIFICANT CHANGE UP (ref 3.8–10.5)
WBC # FLD AUTO: 5.82 K/UL — SIGNIFICANT CHANGE UP (ref 3.8–10.5)

## 2021-08-03 PROCEDURE — 99233 SBSQ HOSP IP/OBS HIGH 50: CPT | Mod: GC

## 2021-08-03 RX ADMIN — Medication 1 MILLIGRAM(S): at 10:16

## 2021-08-03 RX ADMIN — PREGABALIN 1000 MICROGRAM(S): 225 CAPSULE ORAL at 12:25

## 2021-08-03 RX ADMIN — Medication 100 MILLIGRAM(S): at 12:30

## 2021-08-03 RX ADMIN — Medication 1 MILLIGRAM(S): at 12:26

## 2021-08-03 RX ADMIN — Medication 0.5 MILLIGRAM(S): at 20:01

## 2021-08-03 RX ADMIN — Medication 1.5 MILLIGRAM(S): at 05:47

## 2021-08-03 RX ADMIN — Medication 1 TABLET(S): at 12:26

## 2021-08-03 RX ADMIN — Medication 1.5 MILLIGRAM(S): at 02:03

## 2021-08-03 RX ADMIN — Medication 0.5 MILLIGRAM(S): at 14:26

## 2021-08-03 NOTE — PROVIDER CONTACT NOTE (OTHER) - RECOMMENDATIONS
Change Pt Parameters and continue to monitor in 4 hrs Dr. Bullock notified Change Pt Parameters and continue to monitor in 4 hrs

## 2021-08-03 NOTE — PROVIDER CONTACT NOTE (OTHER) - ACTION/TREATMENT ORDERED:
Change Pt Parameters and continue to monitor in 4 hrs Dr. Bullock notified and Change Pt Parameters and continue to monitor in 4 hrs

## 2021-08-03 NOTE — PROGRESS NOTE ADULT - SUBJECTIVE AND OBJECTIVE BOX
PROGRESS NOTE:   Authored by Dr. Zuleyka Lawrence MD (PGY-1). Pager [facility] [phone]/ [facility]     Patient is a 36y old  Male who presents with a chief complaint of Alcohol withdrawal (02 Aug 2021 07:35)      SUBJECTIVE / OVERNIGHT EVENTS:  No acute events overnight.     ADDITIONAL REVIEW OF SYSTEMS:  Patient denies fevers, chills, chest pain, shortness of breath, nausea, abdominal pain, diarrhea, constipation, dysuria, leg swelling, headache, light headedness.    MEDICATIONS  (STANDING):  cyanocobalamin 1000 MICROGram(s) Oral daily  folic acid 1 milliGRAM(s) Oral daily  LORazepam     Tablet   Oral   LORazepam     Tablet 1 milliGRAM(s) Oral every 4 hours  multivitamin 1 Tablet(s) Oral daily  thiamine 100 milliGRAM(s) Oral daily    MEDICATIONS  (PRN):  LORazepam   Injectable 2 milliGRAM(s) IV Push Once PRN CIWA >8      CAPILLARY BLOOD GLUCOSE        I&O's Summary      PHYSICAL EXAM:  Vital Signs Last 24 Hrs  T(C): 36.6 (03 Aug 2021 06:05), Max: 37.2 (02 Aug 2021 21:20)  T(F): 97.8 (03 Aug 2021 06:05), Max: 99 (02 Aug 2021 21:20)  HR: 84 (03 Aug 2021 06:05) (80 - 94)  BP: 111/72 (03 Aug 2021 06:05) (108/72 - 126/88)  BP(mean): --  RR: 19 (03 Aug 2021 06:05) (18 - 19)  SpO2: 100% (03 Aug 2021 06:05) (100% - 100%)    CONSTITUTIONAL: NAD, well-developed  HEET: MMM, EOMI, PERRLA  NECK: supple  RESPIRATORY: Normal respiratory effort; lungs are clear to auscultation bilaterally  CARDIOVASCULAR: Regular rate and rhythm, normal S1 and S2, no murmur/rub/gallop; No lower extremity edema; Peripheral pulses are 2+ bilaterally  ABDOMEN: Nontender to palpation, normoactive bowel sounds, no rebound/guarding; No hepatosplenomegaly  MUSCULOSKELETAL: no clubbing or cyanosis of digits; no joint swelling or tenderness to palpation  NEURO: Moving all four extremities, sensation grossly intact  PSYCH: A+O to person, place, and time; affect appropriate  SKIN: No rash    LABS:                        14.1   5.82  )-----------( 105      ( 03 Aug 2021 07:01 )             42.6     08-02    137  |  101  |  11  ----------------------------<  86  3.8   |  21<L>  |  0.64    Ca    9.7      02 Aug 2021 07:08  Phos  4.1     08-02  Mg     1.90     08-02    TPro  7.4  /  Alb  4.3  /  TBili  1.0  /  DBili  x   /  AST  68<H>  /  ALT  31  /  AlkPhos  90  08-02                Tele Reviewed:    RADIOLOGY & ADDITIONAL TESTS:  Results Reviewed:   Imaging Personally Reviewed:  Electrocardiogram Personally Reviewed:     PROGRESS NOTE:   Authored by Dr. Zuleyka Lawrence MD (PGY-1). Pager [facility] [phone]/ [facility]     Patient is a 36y old  Male who presents with a chief complaint of Alcohol withdrawal (02 Aug 2021 07:35)      SUBJECTIVE / OVERNIGHT EVENTS:  No acute events overnight. CIWA zero.    ADDITIONAL REVIEW OF SYSTEMS:  Patient denies fevers, chills, chest pain, shortness of breath, nausea, abdominal pain, diarrhea, constipation, dysuria, leg swelling, headache, light headedness.    MEDICATIONS  (STANDING):  cyanocobalamin 1000 MICROGram(s) Oral daily  folic acid 1 milliGRAM(s) Oral daily  LORazepam     Tablet   Oral   LORazepam     Tablet 1 milliGRAM(s) Oral every 4 hours  multivitamin 1 Tablet(s) Oral daily  thiamine 100 milliGRAM(s) Oral daily    MEDICATIONS  (PRN):  LORazepam   Injectable 2 milliGRAM(s) IV Push Once PRN CIWA >8      CAPILLARY BLOOD GLUCOSE        I&O's Summary      PHYSICAL EXAM:  Vital Signs Last 24 Hrs  T(C): 36.6 (03 Aug 2021 06:05), Max: 37.2 (02 Aug 2021 21:20)  T(F): 97.8 (03 Aug 2021 06:05), Max: 99 (02 Aug 2021 21:20)  HR: 84 (03 Aug 2021 06:05) (80 - 94)  BP: 111/72 (03 Aug 2021 06:05) (108/72 - 126/88)  BP(mean): --  RR: 19 (03 Aug 2021 06:05) (18 - 19)  SpO2: 100% (03 Aug 2021 06:05) (100% - 100%)    CONSTITUTIONAL: NAD, well-developed  HEET: MMM, EOMI, PERRLA  NECK: supple  RESPIRATORY: Normal respiratory effort; lungs are clear to auscultation bilaterally  CARDIOVASCULAR: Regular rate and rhythm, normal S1 and S2, no murmur/rub/gallop; No lower extremity edema; Peripheral pulses are 2+ bilaterally  ABDOMEN: Nontender to palpation, normoactive bowel sounds, no rebound/guarding; No hepatosplenomegaly  MUSCULOSKELETAL: no clubbing or cyanosis of digits; no joint swelling or tenderness to palpation  NEURO: Moving all four extremities, sensation grossly intact  PSYCH: A+O to person, place, and time; affect appropriate  SKIN: No rash    LABS:                        14.1   5.82  )-----------( 105      ( 03 Aug 2021 07:01 )             42.6     08-02    137  |  101  |  11  ----------------------------<  86  3.8   |  21<L>  |  0.64    Ca    9.7      02 Aug 2021 07:08  Phos  4.1     08-02  Mg     1.90     08-02    TPro  7.4  /  Alb  4.3  /  TBili  1.0  /  DBili  x   /  AST  68<H>  /  ALT  31  /  AlkPhos  90  08-02                Tele Reviewed:    RADIOLOGY & ADDITIONAL TESTS:  Results Reviewed:   Imaging Personally Reviewed:  Electrocardiogram Personally Reviewed:     PROGRESS NOTE:   Authored by Dr. Zuleyka Lawrence MD (PGY-1). Pager [facility] [phone]/ [facility]     Patient is a 36y old  Male who presents with a chief complaint of Alcohol withdrawal (02 Aug 2021 07:35)      SUBJECTIVE / OVERNIGHT EVENTS:  No acute events overnight. CIWA zero.    Patient denies nausea, vomiting, restlessness, tremors, anxiety, palpitations, diaphoresis, audiovisual hallucinations. Endorses pins and needles sensation in bilateral lower extremities from calves to feet. Pt says this is chronic and he was prescribed B12 by PCP for this.     ADDITIONAL REVIEW OF SYSTEMS:  Patient denies fevers, chills, chest pain, shortness of breath, nausea, abdominal pain, diarrhea, constipation, dysuria, leg swelling, headache, light headedness.    MEDICATIONS  (STANDING):  cyanocobalamin 1000 MICROGram(s) Oral daily  folic acid 1 milliGRAM(s) Oral daily  LORazepam     Tablet   Oral   LORazepam     Tablet 1 milliGRAM(s) Oral every 4 hours  multivitamin 1 Tablet(s) Oral daily  thiamine 100 milliGRAM(s) Oral daily    MEDICATIONS  (PRN):  LORazepam   Injectable 2 milliGRAM(s) IV Push Once PRN CIWA >8      CAPILLARY BLOOD GLUCOSE        I&O's Summary      PHYSICAL EXAM:  Vital Signs Last 24 Hrs  T(C): 36.6 (03 Aug 2021 06:05), Max: 37.2 (02 Aug 2021 21:20)  T(F): 97.8 (03 Aug 2021 06:05), Max: 99 (02 Aug 2021 21:20)  HR: 84 (03 Aug 2021 06:05) (80 - 94)  BP: 111/72 (03 Aug 2021 06:05) (108/72 - 126/88)  BP(mean): --  RR: 19 (03 Aug 2021 06:05) (18 - 19)  SpO2: 100% (03 Aug 2021 06:05) (100% - 100%)    CONSTITUTIONAL: NAD, well-developed  HEET: MMM, EOMI, PERRLA  NECK: supple  RESPIRATORY: Normal respiratory effort; lungs are clear to auscultation bilaterally  CARDIOVASCULAR: Regular rate and rhythm, normal S1 and S2, no murmur/rub/gallop; No lower extremity edema; Peripheral pulses are 2+ bilaterally  ABDOMEN: Nontender to palpation, normoactive bowel sounds, no rebound/guarding; No hepatosplenomegaly  MUSCULOSKELETAL: no clubbing or cyanosis of digits; no joint swelling or tenderness to palpation  NEURO: Moving all four extremities, sensation grossly intact  PSYCH: A+O to person, place, and time; affect appropriate, no audiovisual hallucinations  SKIN: No rash    LABS:                        14.1   5.82  )-----------( 105      ( 03 Aug 2021 07:01 )             42.6     08-02    137  |  101  |  11  ----------------------------<  86  3.8   |  21<L>  |  0.64    Ca    9.7      02 Aug 2021 07:08  Phos  4.1     08-02  Mg     1.90     08-02    TPro  7.4  /  Alb  4.3  /  TBili  1.0  /  DBili  x   /  AST  68<H>  /  ALT  31  /  AlkPhos  90  08-02                Tele Reviewed:    RADIOLOGY & ADDITIONAL TESTS:  Results Reviewed:   Imaging Personally Reviewed:  Electrocardiogram Personally Reviewed:

## 2021-08-03 NOTE — PROGRESS NOTE ADULT - ASSESSMENT
Patient is a 36 year old pmhx of alcohol abuse prior DTs who is presenting with EtOH withdrawal. Last drink was 6 days prior to admission. CIWA on admission was 7. Today CIWA is 1. On ativan taper.  Patient is a 36 year old pmhx of alcohol abuse prior DTs who is presenting with EtOH withdrawal. Last drink was 6 days prior to admission. CIWA on admission was 7. On ativan taper.  Patient is a 36 year old pmhx of alcohol abuse prior DTs who is presenting with EtOH withdrawal. Last drink was 7 days ago. CIWA on admission was 7. CIWA 0 today. On ativan taper.

## 2021-08-03 NOTE — PROGRESS NOTE ADULT - PROBLEM SELECTOR PLAN 2
- magnesium 1.3 repleted w/ 2 gm mag  - monitor magnesium - magnesium 1.3 repleted w/ 2 gm mag  RESOLVED  - monitor magnesium

## 2021-08-03 NOTE — PROGRESS NOTE ADULT - PROBLEM SELECTOR PLAN 1
Patient with DTs in the past, last drink 6 days ago. CIWA 1.   - ativan 2 mg Q2 prn CIWA >8  - c/w MV, FA, and B12  - discontinued librium taper due to elevated GGT of 680 on 6/22/21  - c/w ativan taper  - continue CIWA monitoring Patient with DTs in the past, last drink 7 days ago. CIWA 0.   - ativan 2 mg Q2 prn CIWA >8  - c/w MV, FA, and B12  - discontinued librium taper due to elevated GGT of 680 on 6/22/21  - ativan 0.5 mg q6h  - continue CIWA monitoring

## 2021-08-03 NOTE — PROGRESS NOTE ADULT - PROBLEM SELECTOR PLAN 4
- platelets of 100k in setting of alcohol abuse  - f/u PT and INR - platelets of 100k in setting of alcohol abuse

## 2021-08-03 NOTE — SBIRT NOTE ADULT - NSSBIRTBRIEFINTDET_GEN_A_CORE
Pt receptive to engagement in consult and agreeable to reviewing healthy drinking guidelines. This writer reviewed "Rethink Drinking" booklet from [employer] - [place] Department of Health and Human Services. Pt declines interest in connecting to inpt/outpt or AA meetings at this time. Pt receptive to linkage to 120 day care coordination program: [employer] [facility] - p: [phone]. Writer additionally reviewed the following SA resources:  [facility] walk in clinic p:[phone], [facility] Treatment/Care Services for Substance Use List, and [facility] For Tobacco Control information: [address] , [place] [address] p: [phone], as pt endorses recent titration down from cigarette use and desire to fully refrain from use.

## 2021-08-03 NOTE — SBIRT NOTE ADULT - NSSBIRTALCPASSREFTXDET_GEN_A_CORE
This writer outreached [facility]  [place]: [phone] and provide pt's information for enrollment and care coordinator assignment. HC to inform this writer of pt's coordinator once assigned.

## 2021-08-04 ENCOUNTER — TRANSCRIPTION ENCOUNTER (OUTPATIENT)
Age: 36
End: 2021-08-04

## 2021-08-04 VITALS
DIASTOLIC BLOOD PRESSURE: 75 MMHG | SYSTOLIC BLOOD PRESSURE: 105 MMHG | OXYGEN SATURATION: 100 % | HEART RATE: 104 BPM | RESPIRATION RATE: 18 BRPM | TEMPERATURE: 99 F

## 2021-08-04 LAB
ALBUMIN SERPL ELPH-MCNC: 4.1 G/DL — SIGNIFICANT CHANGE UP (ref 3.3–5)
ALP SERPL-CCNC: 90 U/L — SIGNIFICANT CHANGE UP (ref 40–120)
ALT FLD-CCNC: 93 U/L — HIGH (ref 4–41)
ANION GAP SERPL CALC-SCNC: 17 MMOL/L — HIGH (ref 7–14)
AST SERPL-CCNC: 92 U/L — HIGH (ref 4–40)
BILIRUB SERPL-MCNC: 0.5 MG/DL — SIGNIFICANT CHANGE UP (ref 0.2–1.2)
BUN SERPL-MCNC: 11 MG/DL — SIGNIFICANT CHANGE UP (ref 7–23)
CALCIUM SERPL-MCNC: 9.4 MG/DL — SIGNIFICANT CHANGE UP (ref 8.4–10.5)
CHLORIDE SERPL-SCNC: 100 MMOL/L — SIGNIFICANT CHANGE UP (ref 98–107)
CO2 SERPL-SCNC: 20 MMOL/L — LOW (ref 22–31)
CREAT SERPL-MCNC: 0.82 MG/DL — SIGNIFICANT CHANGE UP (ref 0.5–1.3)
GLUCOSE SERPL-MCNC: 103 MG/DL — HIGH (ref 70–99)
HCT VFR BLD CALC: 40 % — SIGNIFICANT CHANGE UP (ref 39–50)
HGB BLD-MCNC: 13.2 G/DL — SIGNIFICANT CHANGE UP (ref 13–17)
MAGNESIUM SERPL-MCNC: 1.8 MG/DL — SIGNIFICANT CHANGE UP (ref 1.6–2.6)
MCHC RBC-ENTMCNC: 32 PG — SIGNIFICANT CHANGE UP (ref 27–34)
MCHC RBC-ENTMCNC: 33 GM/DL — SIGNIFICANT CHANGE UP (ref 32–36)
MCV RBC AUTO: 96.9 FL — SIGNIFICANT CHANGE UP (ref 80–100)
NRBC # BLD: 0 /100 WBCS — SIGNIFICANT CHANGE UP
NRBC # FLD: 0 K/UL — SIGNIFICANT CHANGE UP
PHOSPHATE SERPL-MCNC: 4.5 MG/DL — SIGNIFICANT CHANGE UP (ref 2.5–4.5)
PLATELET # BLD AUTO: 119 K/UL — LOW (ref 150–400)
POTASSIUM SERPL-MCNC: 3.7 MMOL/L — SIGNIFICANT CHANGE UP (ref 3.5–5.3)
POTASSIUM SERPL-SCNC: 3.7 MMOL/L — SIGNIFICANT CHANGE UP (ref 3.5–5.3)
PROT SERPL-MCNC: 7.2 G/DL — SIGNIFICANT CHANGE UP (ref 6–8.3)
RBC # BLD: 4.13 M/UL — LOW (ref 4.2–5.8)
RBC # FLD: 11.8 % — SIGNIFICANT CHANGE UP (ref 10.3–14.5)
SODIUM SERPL-SCNC: 137 MMOL/L — SIGNIFICANT CHANGE UP (ref 135–145)
WBC # BLD: 6.72 K/UL — SIGNIFICANT CHANGE UP (ref 3.8–10.5)
WBC # FLD AUTO: 6.72 K/UL — SIGNIFICANT CHANGE UP (ref 3.8–10.5)

## 2021-08-04 PROCEDURE — 99239 HOSP IP/OBS DSCHRG MGMT >30: CPT | Mod: GC

## 2021-08-04 RX ORDER — THIAMINE MONONITRATE (VIT B1) 100 MG
1 TABLET ORAL
Qty: 30 | Refills: 0
Start: 2021-08-04 | End: 2021-09-02

## 2021-08-04 RX ORDER — PREGABALIN 225 MG/1
1 CAPSULE ORAL
Qty: 30 | Refills: 0
Start: 2021-08-04 | End: 2021-09-02

## 2021-08-04 RX ORDER — NICOTINE POLACRILEX 2 MG
1 GUM BUCCAL
Qty: 30 | Refills: 0
Start: 2021-08-04 | End: 2021-09-02

## 2021-08-04 RX ORDER — FOLIC ACID 0.8 MG
1 TABLET ORAL
Qty: 30 | Refills: 0
Start: 2021-08-04 | End: 2021-09-02

## 2021-08-04 RX ADMIN — Medication 0.5 MILLIGRAM(S): at 01:57

## 2021-08-04 RX ADMIN — Medication 0.5 MILLIGRAM(S): at 15:06

## 2021-08-04 RX ADMIN — Medication 1 TABLET(S): at 12:33

## 2021-08-04 RX ADMIN — Medication 100 MILLIGRAM(S): at 12:35

## 2021-08-04 RX ADMIN — Medication 0.5 MILLIGRAM(S): at 08:40

## 2021-08-04 RX ADMIN — PREGABALIN 1000 MICROGRAM(S): 225 CAPSULE ORAL at 12:35

## 2021-08-04 RX ADMIN — Medication 1 MILLIGRAM(S): at 12:35

## 2021-08-04 NOTE — DISCHARGE NOTE PROVIDER - HOSPITAL COURSE
Patient is a 35 y/o male with PMH of alcohol abuse and prior delirium tremens who presented with nausea, vomiting, and tremors. He was admitted for alcohol withdrawal. Patient was started on an Ativan taper and CIWA protocol was followed. Patient was given IVF, thiamine, folic acid and B12. Patient's CIWA score is now 0 and symptoms have resolved. Patient is hemodynically stable and ready for discharge. Patient should follow up with his PCP within 2 weeks. Patient is a 35 y/o male with PMH of alcohol abuse and prior delirium tremens who presented with nausea, vomiting, and tremors. He was admitted for alcohol withdrawal. Patient was started on an Ativan taper and CIWA protocol was followed. Patient was given IVF, thiamine, folic acid and B12. Patient's CIWA score is now 0 and symptoms have resolved. Patient was counseled about alcohol abuse and declined interest in inpatient and outpatient treatment/AA meetings. Patient was provided information for [facility] crisis walk in clinic. Patient is hemodynamically stable and ready for discharge. Patient should follow up with his PCP within 2 weeks.

## 2021-08-04 NOTE — DISCHARGE NOTE PROVIDER - NSDCCPCAREPLAN_GEN_ALL_CORE_FT
PRINCIPAL DISCHARGE DIAGNOSIS  Diagnosis: Alcohol dependence with unspecified alcohol-induced disorder  Assessment and Plan of Treatment: You came to the hospital with symptoms of alcohol withdrawal. You were placed on an Ativan taper and were monitored closely until your symptoms resolved. Please continue to take vitamin B12, folate, and thiamine (vitamin B1) at home. Follow up with your primary care provider within 2 weeks.       PRINCIPAL DISCHARGE DIAGNOSIS  Diagnosis: Alcohol dependence with unspecified alcohol-induced disorder  Assessment and Plan of Treatment: You came to the hospital with symptoms of alcohol withdrawal. You were placed on an Ativan taper and were monitored closely until your symptoms resolved. Please continue to take vitamin B12, folate, and thiamine (vitamin B1) at home. Follow up with your primary care provider within 2 weeks.  Please avoid alcohol.

## 2021-08-04 NOTE — PROGRESS NOTE ADULT - ATTENDING COMMENTS
Pt examined bedside. CIWA 1. Pt's AST is 49 and GGT > 600 on previous admission for same reason. Generally pt's with liver disease in the setting of severe alcohol use (indicated by labs such as GGT >177, AST >63 or ALT >67 generally indicate a pt is a severe user of alcohol (p<0.05)), then ativan is used as librium is metabolized by the liver. Liver function should be further evaluated by checking INR/PT. Thrmbocytopenia from alcohol use vs liver dysfunction. CT scan was negative for any pancreatic stranding and pt denies any abd pain so elevation in lipase is too non-specific to be the only indicator of pancreatitis which is unlikely at this point. CT scan did not mention hepatomegaly but my own gross measurements were 22cm, well above the normal range of 16.5 cm for males likely indicating steatosis. Pt should receive glucose, thiamine, B12, FA, along with mg and potassium supplementation, be on an ativan taper with CIWA monitoring. Nutrition education for calorie count. OP/IP rehab with community AA should be offered upon d/c. Plan discussed with resident.    Malika Silva MD  Attending Hospitalist
36M EtOH abuse c/b DT, fatty liver p/w EtOH withdrawal. Monitor CIWA with ativan standing taper.
36M EtOH abuse c/b DT, fatty liver p/w EtOH withdrawal. Monitor CIWA with ativan standing taper. Decreased the dosage as patient is scoring 0 on CIWA. D/C ativan taper.  Patient medically optimized for discharge.  Discharge planning 40 minutes - discussed with patient and consultants.
36M EtOH abuse c/b DT, fatty liver p/w EtOH withdrawal. Monitor CIWA with ativan standing taper. Decreased the dosage as patient is scoring 0 on CIWA.

## 2021-08-04 NOTE — DISCHARGE NOTE PROVIDER - NSDCMRMEDTOKEN_GEN_ALL_CORE_FT
Multiple Vitamins oral tablet: 1 tab(s) orally once a day  nicotine 21 mg/24 hr transdermal film, extended release: 1 film(s) transdermal once a day    cyanocobalamin 1000 mcg oral tablet: 1 tab(s) orally once a day  folic acid 1 mg oral tablet: 1 tab(s) orally once a day  Multiple Vitamins oral tablet: 1 tab(s) orally once a day  nicotine 21 mg/24 hr transdermal film, extended release: 1 film(s) transdermal once a day   thiamine 100 mg oral tablet: 1 tab(s) orally once a day

## 2021-08-04 NOTE — PROGRESS NOTE ADULT - PROBLEM SELECTOR PROBLEM 5
Non-intractable vomiting with nausea

## 2021-08-04 NOTE — PROGRESS NOTE ADULT - PROBLEM SELECTOR PLAN 1
Patient with DTs in the past, last drink 7 days ago. CIWA 0.   - ativan 2 mg Q2 prn CIWA >8  - c/w MV, FA, and B12  - discontinued librium taper due to elevated GGT of 680 on 6/22/21  - ativan 0.5 mg q6h  - continue CIWA monitoring Patient with DTs in the past, last drink 7 days ago. CIWA 0.   - ativan 2 mg Q2 prn CIWA >8  - c/w MV, FA, and B12  - discontinued librium taper due to elevated GGT of 680 on 6/22/21  - can d/c ativan taper  - d/c to home.

## 2021-08-04 NOTE — PROGRESS NOTE ADULT - SUBJECTIVE AND OBJECTIVE BOX
PROGRESS NOTE:   Authored by Dr. Zuleyka Lawrence MD (PGY-1). Pager [facility] [phone]/ [facility]     Patient is a 36y old  Male who presents with a chief complaint of Alcohol withdrawal (03 Aug 2021 09:19)      SUBJECTIVE / OVERNIGHT EVENTS:  No acute events overnight.     ADDITIONAL REVIEW OF SYSTEMS:  Patient denies fevers, chills, chest pain, shortness of breath, nausea, abdominal pain, diarrhea, constipation, dysuria, leg swelling, headache, light headedness.    MEDICATIONS  (STANDING):  cyanocobalamin 1000 MICROGram(s) Oral daily  folic acid 1 milliGRAM(s) Oral daily  LORazepam     Tablet 0.5 milliGRAM(s) Oral every 6 hours  multivitamin 1 Tablet(s) Oral daily  thiamine 100 milliGRAM(s) Oral daily    MEDICATIONS  (PRN):  LORazepam   Injectable 2 milliGRAM(s) IV Push Once PRN CIWA >8      CAPILLARY BLOOD GLUCOSE        I&O's Summary      PHYSICAL EXAM:  Vital Signs Last 24 Hrs  T(C): 37.7 (04 Aug 2021 01:07), Max: 37.7 (04 Aug 2021 01:07)  T(F): 99.8 (04 Aug 2021 01:07), Max: 99.8 (04 Aug 2021 01:07)  HR: 98 (04 Aug 2021 01:07) (83 - 106)  BP: 118/77 (04 Aug 2021 01:07) (106/89 - 119/90)  BP(mean): --  RR: 19 (04 Aug 2021 01:07) (18 - 19)  SpO2: 100% (04 Aug 2021 01:07) (100% - 100%)    CONSTITUTIONAL: NAD, well-developed  HEET: MMM, EOMI, PERRLA  NECK: supple  RESPIRATORY: Normal respiratory effort; lungs are clear to auscultation bilaterally  CARDIOVASCULAR: Regular rate and rhythm, normal S1 and S2, no murmur/rub/gallop; No lower extremity edema; Peripheral pulses are 2+ bilaterally  ABDOMEN: Nontender to palpation, normoactive bowel sounds, no rebound/guarding; No hepatosplenomegaly  MUSCULOSKELETAL: no clubbing or cyanosis of digits; no joint swelling or tenderness to palpation  NEURO: Moving all four extremities, sensation grossly intact  PSYCH: A+O to person, place, and time; affect appropriate  SKIN: No rash    LABS:                        14.1   5.82  )-----------( 105      ( 03 Aug 2021 07:01 )             42.6     08-03    134<L>  |  98  |  10  ----------------------------<  86  3.8   |  17<L>  |  0.62    Ca    9.8      03 Aug 2021 07:01  Phos  4.4     08-03  Mg     1.80     08-03    TPro  7.6  /  Alb  4.3  /  TBili  0.9  /  DBili  x   /  AST  99<H>  /  ALT  59<H>  /  AlkPhos  90  08-03                Tele Reviewed:    RADIOLOGY & ADDITIONAL TESTS:  Results Reviewed:   Imaging Personally Reviewed:  Electrocardiogram Personally Reviewed:     PROGRESS NOTE:   Authored by Dr. Zuleyka Lawrence MD (PGY-1). Pager [facility] [phone]/ [facility]     Patient is a 36y old  Male who presents with a chief complaint of Alcohol withdrawal (03 Aug 2021 09:19)      SUBJECTIVE / OVERNIGHT EVENTS:  No acute events overnight. No tremors today.    ADDITIONAL REVIEW OF SYSTEMS:  Patient denies fevers, chills, chest pain, shortness of breath, nausea, abdominal pain, diarrhea, constipation, dysuria, leg swelling, headache, light headedness.    MEDICATIONS  (STANDING):  cyanocobalamin 1000 MICROGram(s) Oral daily  folic acid 1 milliGRAM(s) Oral daily  LORazepam     Tablet 0.5 milliGRAM(s) Oral every 6 hours  multivitamin 1 Tablet(s) Oral daily  thiamine 100 milliGRAM(s) Oral daily    MEDICATIONS  (PRN):  LORazepam   Injectable 2 milliGRAM(s) IV Push Once PRN CIWA >8      CAPILLARY BLOOD GLUCOSE        I&O's Summary      PHYSICAL EXAM:  Vital Signs Last 24 Hrs  T(C): 37.7 (04 Aug 2021 01:07), Max: 37.7 (04 Aug 2021 01:07)  T(F): 99.8 (04 Aug 2021 01:07), Max: 99.8 (04 Aug 2021 01:07)  HR: 98 (04 Aug 2021 01:07) (83 - 106)  BP: 118/77 (04 Aug 2021 01:07) (106/89 - 119/90)  BP(mean): --  RR: 19 (04 Aug 2021 01:07) (18 - 19)  SpO2: 100% (04 Aug 2021 01:07) (100% - 100%)    CONSTITUTIONAL: NAD, well-developed  HEET: MMM, EOMI, PERRLA  NECK: supple  RESPIRATORY: Normal respiratory effort; lungs are clear to auscultation bilaterally  CARDIOVASCULAR: Regular rate and rhythm, normal S1 and S2, no murmur/rub/gallop; No lower extremity edema; Peripheral pulses are 2+ bilaterally  ABDOMEN: Nontender to palpation, normoactive bowel sounds, no rebound/guarding; No hepatosplenomegaly  MUSCULOSKELETAL: no clubbing or cyanosis of digits; no joint swelling or tenderness to palpation  NEURO: Moving all four extremities, sensation grossly intact  PSYCH: A+O to person, place, and time; affect appropriate  SKIN: No rash    LABS:                        14.1   5.82  )-----------( 105      ( 03 Aug 2021 07:01 )             42.6     08-03    134<L>  |  98  |  10  ----------------------------<  86  3.8   |  17<L>  |  0.62    Ca    9.8      03 Aug 2021 07:01  Phos  4.4     08-03  Mg     1.80     08-03    TPro  7.6  /  Alb  4.3  /  TBili  0.9  /  DBili  x   /  AST  99<H>  /  ALT  59<H>  /  AlkPhos  90  08-03                Tele Reviewed:    RADIOLOGY & ADDITIONAL TESTS:  Results Reviewed:   Imaging Personally Reviewed:  Electrocardiogram Personally Reviewed:     PROGRESS NOTE:   Authored by Dr. Zuleyka Lawrence MD (PGY-1). Pager [facility] [phone]/ [facility]     Patient is a 36y old  Male who presents with a chief complaint of Alcohol withdrawal (03 Aug 2021 09:19)      SUBJECTIVE / OVERNIGHT EVENTS:  No acute events overnight. No tremors today.    Denies nausea, vomiting, palpitations, hallucinations.    ADDITIONAL REVIEW OF SYSTEMS:  Patient denies fevers, chills, chest pain, shortness of breath, nausea, abdominal pain, diarrhea, constipation, dysuria, leg swelling, headache, light headedness.    MEDICATIONS  (STANDING):  cyanocobalamin 1000 MICROGram(s) Oral daily  folic acid 1 milliGRAM(s) Oral daily  LORazepam     Tablet 0.5 milliGRAM(s) Oral every 6 hours  multivitamin 1 Tablet(s) Oral daily  thiamine 100 milliGRAM(s) Oral daily    MEDICATIONS  (PRN):  LORazepam   Injectable 2 milliGRAM(s) IV Push Once PRN CIWA >8      CAPILLARY BLOOD GLUCOSE        I&O's Summary      PHYSICAL EXAM:  Vital Signs Last 24 Hrs  T(C): 37.7 (04 Aug 2021 01:07), Max: 37.7 (04 Aug 2021 01:07)  T(F): 99.8 (04 Aug 2021 01:07), Max: 99.8 (04 Aug 2021 01:07)  HR: 98 (04 Aug 2021 01:07) (83 - 106)  BP: 118/77 (04 Aug 2021 01:07) (106/89 - 119/90)  BP(mean): --  RR: 19 (04 Aug 2021 01:07) (18 - 19)  SpO2: 100% (04 Aug 2021 01:07) (100% - 100%)    CONSTITUTIONAL: NAD, well-developed  HEET: MMM, EOMI, PERRLA  NECK: supple  RESPIRATORY: Normal respiratory effort; lungs are clear to auscultation bilaterally  CARDIOVASCULAR: Regular rate and rhythm, normal S1 and S2, no murmur/rub/gallop; No lower extremity edema; Peripheral pulses are 2+ bilaterally  ABDOMEN: Nontender to palpation, normoactive bowel sounds, no rebound/guarding; No hepatosplenomegaly  MUSCULOSKELETAL: no clubbing or cyanosis of digits; no joint swelling or tenderness to palpation  NEURO: Moving all four extremities, sensation grossly intact  PSYCH: A+O to person, place, and time; affect appropriate  SKIN: No rash    LABS:                        14.1   5.82  )-----------( 105      ( 03 Aug 2021 07:01 )             42.6     08-03    134<L>  |  98  |  10  ----------------------------<  86  3.8   |  17<L>  |  0.62    Ca    9.8      03 Aug 2021 07:01  Phos  4.4     08-03  Mg     1.80     08-03    TPro  7.6  /  Alb  4.3  /  TBili  0.9  /  DBili  x   /  AST  99<H>  /  ALT  59<H>  /  AlkPhos  90  08-03                Tele Reviewed:    RADIOLOGY & ADDITIONAL TESTS:  Results Reviewed:   Imaging Personally Reviewed:  Electrocardiogram Personally Reviewed:

## 2021-08-04 NOTE — DISCHARGE NOTE NURSING/CASE MANAGEMENT/SOCIAL WORK - PATIENT PORTAL LINK FT
You can access the FollowMyHealth Patient Portal offered by [facility] by registering at the following website: [email or web address]. By joining [employer]s FollowMyHealth portal, you will also be able to view your health information using other applications (apps) compatible with our system.

## 2021-08-04 NOTE — DISCHARGE NOTE PROVIDER - CARE PROVIDER_API CALL
CALE BUTCHER  [id number] [place] [address]  Phone: ()-  Fax: ()-  Follow Up Time: 2 weeks

## 2021-08-04 NOTE — PROGRESS NOTE ADULT - ASSESSMENT
Patient is a 36 year old pmhx of alcohol abuse prior DTs who is presenting with EtOH withdrawal. Last drink was 7 days ago. CIWA on admission was 7. CIWA 0 today. On ativan taper.

## 2021-08-06 LAB
HAV IGM SER-ACNC: SIGNIFICANT CHANGE UP
HBV CORE IGM SER-ACNC: SIGNIFICANT CHANGE UP
HBV SURFACE AG SER-ACNC: SIGNIFICANT CHANGE UP
HCV AB S/CO SERPL IA: 0.09 S/CO — SIGNIFICANT CHANGE UP (ref 0–0.99)
HCV AB SERPL-IMP: SIGNIFICANT CHANGE UP

## 2021-11-05 ENCOUNTER — TRANSCRIPTION ENCOUNTER (OUTPATIENT)
Age: 36
End: 2021-11-05

## 2022-01-17 ENCOUNTER — TRANSCRIPTION ENCOUNTER (OUTPATIENT)
Age: 37
End: 2022-01-17

## 2022-01-31 NOTE — H&P ADULT - PROBLEM SELECTOR PLAN 1
-patient with DTs in the past, last drink 4 days ago  -initial CIWA 7-->5--->2 at 3 am  -librium 50Q8 with taper  -ativan 2 mg Q2 prn CIWA >8  -c/w MV, FA, and B12  -pt denies suicidal ideation or depression
No

## 2022-07-27 ENCOUNTER — NON-APPOINTMENT (OUTPATIENT)
Age: 37
End: 2022-07-27

## 2022-09-08 ENCOUNTER — NON-APPOINTMENT (OUTPATIENT)
Age: 37
End: 2022-09-08

## 2022-11-16 NOTE — CHART NOTE - NSCHARTNOTEFT_GEN_A_CORE
met with patient at the bedside.   Patient shared that he was in detox for alcohol abuse but his treatment is now over he is now requesting help to remain sober.   He noted that he resides with his parents and that they are generally supportive. He is looking forward to going home.  Patient provided with information for therapist to help with the urges and maintain sobriety.    Patient is socially cleared for discharge when medically cleared. 4

## 2022-11-25 NOTE — ED PROVIDER NOTE - ATTENDING CONTRIBUTION TO CARE
(E4) spontaneous Agree with history and physical as above.   Plan A/P Labs, imaging, medicate, reassess

## 2022-12-07 NOTE — ED PROVIDER NOTE - CADM POA CENTRAL LINE
NEVER HAD COLONSOCOPY, NO BLEEDING, NO CONSTIPATION, NO ABDOMINAL PAIN, NO WT LOSS,   BM REGULAR,   NO FHCC/FHCP  NO HEART OR LUNG  PROBLEM.
No

## 2023-03-20 NOTE — ED ADULT NURSE NOTE - SUICIDE SCREENING DEPRESSION
Negative Topical Sulfur Applications Counseling: Topical Sulfur Counseling: Patient counseled that this medication may cause skin irritation or allergic reactions.  In the event of skin irritation, the patient was advised to reduce the amount of the drug applied or use it less frequently.   The patient verbalized understanding of the proper use and possible adverse effects of topical sulfur application.  All of the patient's questions and concerns were addressed.

## 2023-05-22 NOTE — H&P ADULT - NSHPREVIEWOFSYSTEMS_GEN_ALL_CORE
Is This A New Presentation, Or A Follow-Up?: Acne What Type Of Note Output Would You Prefer (Optional)?: Bullet Format Additional Comments (Use Complete Sentences): Doing three pills weekly for accutane and doing well. unable to obtain as pt is not cooperating. complaints of no pain

## 2024-01-30 NOTE — DISCHARGE NOTE PROVIDER - HOSPITAL COURSE
[See scanned document for history] : See scanned document for history 36 year old man with PMH of chronic alcohol abuse brought in after a witnessed generalized seizure episode. Pt is admitted for alcohol withdrawals seizures. RRT was called as pt was getting agitated on floor and trying to get out of bed. CIWA is 16. Pt will be transferred to ICU for Alcohol abuse and started on Precedex drip then tapered off as tolerated. Ativan 2mg q2h PRN with standing ativan. Pt then switched to standing librium taper. Pt was NPO initially. Advanced the diet as tolerated. Ativan standing decreases to 2mg q4hr. Librium was added. 25mg q8hr. Elevated liver enzymes due to alcohol abuse. Lipase elevated but pt is without abdominal pain. US abdomen shows no cholelithiasis or CBD dilation.  consulted. Pt is stable to downgrade from floor.    Patient expresses desire to sign out AMA. Risk and complication related to leaving against medical advised described to patient in detail. Patient expresses understanding risk associated with leaving the hospital and continues to insist on leaving.   [Verbally] : verbally 36 year old man with PMH of chronic alcohol abuse brought in after a witnessed generalized seizure episode. Pt is admitted for alcohol withdrawals seizures. RRT was called as pt was getting agitated on floor and trying to get out of bed. CIWA is 16. Pt will be transferred to ICU for Alcohol abuse and started on Precedex drip then tapered off as tolerated. Ativan 2mg q2h PRN with standing ativan. Pt then switched to standing librium taper. Pt was NPO initially. Advanced the diet as tolerated. Ativan standing decreases to 2mg q4hr. Librium was added. 25mg q8hr. Elevated liver enzymes due to alcohol abuse. Lipase elevated but pt is without abdominal pain. US abdomen shows no cholelithiasis or CBD dilation.  consulted. Pt is stable for downgrade to floor. Spoke to patient, brother, and mother at length regarding patient treatment plan. Patient for discharge home with librium taper.     Given patient's improved clinical status and current hemodynamic stability, decision was made to discharge. Discussed with attending. Please refer to patient's complete medical chart with documents for a full hospital course, for this is only a brief summary.     36 year old man with PMH of chronic alcohol abuse brought in after a witnessed generalized seizure episode. Pt is admitted for alcohol withdrawals seizures. RRT was called as pt was getting agitated on floor and trying to get out of bed. CIWA is 16. Pt will be transferred to ICU for Alcohol abuse and started on Precedex drip then tapered off as tolerated. Ativan 2mg q2h PRN with standing ativan. Pt then switched to standing librium taper. Pt was NPO initially. Advanced the diet as tolerated. Ativan standing decreases to 2mg q4hr. Librium was added. 25mg q8hr. Elevated liver enzymes due to alcohol abuse. Lipase elevated but pt is without abdominal pain. US abdomen shows no cholelithiasis or CBD dilation.  consulted. Pt is stable for downgrade to floor. Spoke to patient, brother, and mother at length regarding patient treatment plan. Last librium dose 6/27 6am. Pt off librium for >24hrs prior to discharge. Patient for discharge home without librium taper.     Given patient's improved clinical status and current hemodynamic stability, decision was made to discharge. Discussed with attending. Please refer to patient's complete medical chart with documents for a full hospital course, for this is only a brief summary.

## 2024-04-19 ENCOUNTER — NON-APPOINTMENT (OUTPATIENT)
Age: 39
End: 2024-04-19

## 2024-09-10 NOTE — ED PROVIDER NOTE - NSTIMEPROVIDERCAREINITIATE_GEN_ER
Daily Note     Today's date: 2024  Patient name: Cande Heredia  : 1982  MRN: [id number]  Referring provider: Juaquin Frankel DO  Dx:   Encounter Diagnosis     ICD-10-CM    1. Acute pain of left knee  M25.562       2. Swelling of left knee joint  M25.462       3. Other tear of medial meniscus of left knee as current injury, initial encounter  S83.242A       4. Patellofemoral disorder of left knee  M22.2X2           Start Time: 1230  Stop Time: 1315  Total time in clinic (min): 45 minutes    Subjective: Pt reports feeling much better than last session. She states her knee feels most uncomfortable still bending it for longer periods of time.       Objective: See treatment diary below      Assessment: Tolerated treatment well. Pt tolerated step up and overs with improved tolerance compared to previous session, instructed pt in posterior weight shift and neutral knee position with improved form observed. Patient demonstrated fatigue post treatment, exhibited good technique with therapeutic exercises, and would benefit from continued PT      Plan: Continue per plan of care.      Precautions: not significant  *HEP: [id number]     Manuals 8/28 9/4 9/11   8/12  8/15  8/19  8/26   STM               [facility]                                               Neuro Re-Ed               Pt education               Bridges 10# 3x10 10# 3x10 10# 3x10  3x10 5# 3x10  8# 3x10  10# 3x10   Clamshells      HEP      SLS               HS stretch               Calf stretch           SLR  1# 3x10  1# 3x10 1# 3x10  3x12  3x15  1# 3x8   1# 3x10   Heel slides               Ther Ex               SAQ 5# 2x10 ea 5# 2x10 ea 5# 2x10 ea  2# 2x10 ea  2.5# 2x10 ea  3# 2x10 ea  4# 2x10 ea   LAQ 5# 2x10 ea 5# 2x10 ea 5# 2x10 ea   2# 2x10 ea  2.5# 2x10 ea  3# 2x10 ea  4# 2x10 ea   HS curls (matrix) DL 75# 2x10 DL 75# 2x10 DL 75# 2x10   DL 55# 2x10  DL 55# 2x10  DL 60# 2x10   DL 65# 2x10   Standing HR 1 up 1 down 2x10 1 up 1 down 2x10 1 up 1 down  "2x10  HEP   1 up 1 down 2x10 1 up 1 down 2x10   Bike 5' lvl 1 5' lvl 1 5' lvl 1  5' lvl 1  5' lvl 1  5' lvl 1   5' lvl 1                   Ther Activity               STS  10# 3x10 10# 3x10 10# 3x10  3x10  5# 3x10  8# 3x10  10# 3x10   Side steps blue 3 laps blue 3 laps blue 3 laps  grn 3 laps  grn 3 laps  blue 3 laps  blue 3 laps   Step ups (FW) 8\" 2x10 step up and overs nv 6\" 2x10 step up and overs   6\" 2x10 step up and overs  6\" 2x10 step up and overs  8\" 2x10 step up and overs  8\" 2x10 step up and overs   S/L leg press (position 5) 65# 2x10 ea 75# 2x10 ea 75# 2x10 ea   nv 45# 2x10 ea  55# 2x10 ea 65# 2x10 ea   Tib raises      HEP         Modalities               cryotherapy                                                             " 27-May-2021 12:31